# Patient Record
Sex: FEMALE | Race: WHITE | NOT HISPANIC OR LATINO | Employment: OTHER | URBAN - NONMETROPOLITAN AREA
[De-identification: names, ages, dates, MRNs, and addresses within clinical notes are randomized per-mention and may not be internally consistent; named-entity substitution may affect disease eponyms.]

---

## 2020-12-30 ENCOUNTER — OFFICE VISIT (OUTPATIENT)
Dept: OTOLARYNGOLOGY | Facility: OTHER | Age: 35
End: 2020-12-30
Attending: PHYSICIAN ASSISTANT
Payer: COMMERCIAL

## 2020-12-30 VITALS
HEART RATE: 84 BPM | TEMPERATURE: 97.1 F | OXYGEN SATURATION: 98 % | SYSTOLIC BLOOD PRESSURE: 124 MMHG | BODY MASS INDEX: 30.83 KG/M2 | WEIGHT: 174 LBS | DIASTOLIC BLOOD PRESSURE: 72 MMHG | HEIGHT: 63 IN

## 2020-12-30 DIAGNOSIS — R49.0 HOARSENESS: ICD-10-CM

## 2020-12-30 DIAGNOSIS — R22.1 NECK FULLNESS: ICD-10-CM

## 2020-12-30 DIAGNOSIS — J34.3 NASAL TURBINATE HYPERTROPHY: ICD-10-CM

## 2020-12-30 DIAGNOSIS — K21.9 LPRD (LARYNGOPHARYNGEAL REFLUX DISEASE): Primary | ICD-10-CM

## 2020-12-30 DIAGNOSIS — Z72.0 TOBACCO USE: ICD-10-CM

## 2020-12-30 PROCEDURE — 31575 DIAGNOSTIC LARYNGOSCOPY: CPT | Performed by: PHYSICIAN ASSISTANT

## 2020-12-30 PROCEDURE — 99203 OFFICE O/P NEW LOW 30 MIN: CPT | Mod: 25 | Performed by: PHYSICIAN ASSISTANT

## 2020-12-30 RX ORDER — NAPROXEN 500 MG/1
500 TABLET ORAL
COMMUNITY
End: 2020-12-30

## 2020-12-30 RX ORDER — OMEPRAZOLE 40 MG/1
40 CAPSULE, DELAYED RELEASE ORAL DAILY
Qty: 30 CAPSULE | Refills: 3 | Status: SHIPPED | OUTPATIENT
Start: 2020-12-30 | End: 2022-09-06

## 2020-12-30 RX ORDER — FLUTICASONE PROPIONATE 50 MCG
2 SPRAY, SUSPENSION (ML) NASAL DAILY
Qty: 16 G | Refills: 11 | Status: SHIPPED | OUTPATIENT
Start: 2020-12-30 | End: 2022-09-06

## 2020-12-30 RX ORDER — CLINDAMYCIN HCL 300 MG
CAPSULE ORAL
COMMUNITY
Start: 2020-01-07 | End: 2020-12-30

## 2020-12-30 RX ORDER — CLOTRIMAZOLE 1 %
CREAM (GRAM) TOPICAL 2 TIMES DAILY
COMMUNITY
End: 2021-02-02

## 2020-12-30 RX ORDER — MELATONIN 10 MG
20 CAPSULE ORAL
COMMUNITY
End: 2020-12-30

## 2020-12-30 RX ORDER — NITROFURANTOIN 25; 75 MG/1; MG/1
CAPSULE ORAL
COMMUNITY
Start: 2020-08-27 | End: 2020-12-30

## 2020-12-30 RX ORDER — PHENAZOPYRIDINE HYDROCHLORIDE 100 MG/1
TABLET, FILM COATED ORAL
COMMUNITY
Start: 2020-08-27 | End: 2020-12-30

## 2020-12-30 ASSESSMENT — PAIN SCALES - GENERAL: PAINLEVEL: NO PAIN (1)

## 2020-12-30 ASSESSMENT — MIFFLIN-ST. JEOR: SCORE: 1443.39

## 2020-12-30 NOTE — LETTER
2020         RE: Christina R Aaseng  [address]  [address]  [facility] [address]        Dear Colleague,    Thank you for referring your patient, Christina R Aaseng, to the [facility]. Please see a copy of my visit note below.    Otolaryngology Consultation    Patient: Christina R Aaseng  : 1985    Patient presents with:  Throat Problem: Pt has been referred by Dr. Brush for chronic laryngitis.      HPI:  Christina R Aaseng is a 35 year old female seen today for chronic laryngitis, tonsil concerns.   She has been c/o vocal hoarseness for the last several months. Describes episodes of laryngitis- normal voice then hoarseness or aphonia.   Typically she will try vocal rest which does improve her symptoms.  However, it seems to be occurring more frequently and has been ongoing for several months.   Further states she has developed episodic swelling along her neck, describes mass and then subsides.     She does use tobacco- 1/2 ppd for 20+ years.   She has been taking OTC remedies without relief.   She does not recall reflux symptoms.   She has increase in seasonal allergies. No prior MQT.   No worrisome sinus complaints.   +Post nasal drainage.   +Hx of neck swelling.     Patient denies sore throat or throat pain  Denies chronic otalgia.   Denies fevers, or weight loss. Night sweats.  +headaches Hx of TBI. No recent neuro for the last 5 years.   Denies dysphagia.   Denies globus sensation.   No cervical pain or tenderness.     Water- Limited  Caffeine- coffee- 2 cups/ koolaid/ teas  ETOH- None- 8 months sober.   Tobacco- Currently using, 1/2 PPD  Reflux- None.   Thyroid labs completed and normal.     Reports diarrhea has been ongoing for several weeks and has not been improving.   +Fatigue  Completed labs- thyroid, CBC, CMP.  No stool sample/ imaging completed.       No current outpatient medications on file.       Allergies: Patient has no allergy information on record.     No  "past medical history on file.    No past surgical history on file.    ENT family history reviewed    Social History     Tobacco Use     Smoking status: Not on file   Substance Use Topics     Alcohol use: Not on file     Drug use: Not on file       Review of Systems  ROS: 10 point ROS neg other than the symptoms noted above in the HPI     Physical Exam  /72 (Cuff Size: Adult Regular)   Pulse 84   Temp 97.1  F (36.2  C) (Tympanic)   Ht 1.6 m (5' 3\")   Wt 78.9 kg (174 lb)   SpO2 98%   BMI 30.82 kg/m    General - The patient is well nourished and well developed, and appears to have good nutritional status.  Alert and oriented to person and place, answers questions and cooperates with examination appropriately. Gait- walks with limp. Does have healed site of injury to left ankle/ lower leg.   Head and Face - Normocephalic and atraumatic, with no gross asymmetry noted.  The facial nerve is intact, with strong symmetric movements.  Voice and Breathing - The patient was breathing comfortably without the use of accessory muscles. There was no wheezing, stridor, or stertor.  The patients voice was clear and strong, and had appropriate pitch and quality. There is mild hoarseness, raspy at times.   Ears -The external auditory canals are patent, the tympanic membranes are intact without effusion, retraction or mass.  Bony landmarks are intact.  Eyes - Extraocular movements intact, and the pupils were reactive to light.  Sclera were not icteric or injected, conjunctiva were pink and moist.  Mouth - Examination of the oral cavity showed pink, healthy oral mucosa. No lesions or ulcerations noted.  The tongue was mobile and midline, and the dentition were in good condition.    Throat - The walls of the oropharynx were smooth, pink, moist, symmetric, and had no lesions or ulcerations.  The tonsillar pillars and soft palate were symmetric. Tonsils- noted erythema along tonsil tissue. Bilateral tonsils grade 3. The uvula " was midline on elevation.    Neck - Normal midline excursion of the laryngotracheal complex during swallowing.  Full range of motion on passive movement.  Palpation of the occipital, submental, submandibular, internal jugular chain, and supraclavicular nodes did not demonstrate any abnormal lymph nodes or masses.  Palpation of the thyroid was soft and smooth, with no nodules or goiter appreciated.  The trachea was mobile and midline. No palpable mass.   Nose - External contour is symmetric, no gross deflection or scars.  Nasal mucosa is pink and moist with no abnormal mucus.  +boggy enlarged turbinates.     Flexible Endoscopy -    Attempts at mirror laryngoscopy were not possible due to gag reflex.  Therefore I proceeded with a fiberoptic examination.  First I sprayed both sides of the nose with a mixture of lidocaine and neosynephrine.  I then passed the scope through the nasal cavity.    The nasal cavity was unremarkable.  The nasopharynx was mucosally covered and symmetric.  The Eustachian tube openings were unobstructed.  Going further down I had a clear view of the base of tongue, which had normal appearing lingual tonsillar tissue.  The base of tongue was free of lesions, and the vallecula was open.  The epiglottis was smooth and mucosally covered.  The supraglottic larynx was then clearly visualized.  The vocal cords moved smoothly and symmetrically, they were slightly edematous but pearly white and no lesions were seen.  I did note a significant amount of edema and redundant mucosa in the interarytenoid soft tissues and posterior surface of the larynx.  The pyriform sinuses were open, and the limited view of the postcricoid region did not show any erosive or mass lesions.          ASSESSMENT:      ICD-10-CM    1. LPRD (laryngopharyngeal reflux disease)  K21.9 omeprazole (PRILOSEC) 40 MG DR capsule     CT Soft Tissue Neck w Contrast   2. Hoarseness  R49.0 omeprazole (PRILOSEC) 40 MG DR capsule     CT Soft  Tissue Neck w Contrast   3. Neck fullness  R22.1 CT Soft Tissue Neck w Contrast   4. Nasal turbinate hypertrophy  J34.3 fluticasone (FLONASE) 50 MCG/ACT nasal spray   5. Tobacco use  Z72.0 CT Soft Tissue Neck w Contrast     Start Flonase 2 sprays to each nostril daily.     Start prilosec 40 mg daily.   Complete Neck CT.   Once scan completed, would refer for speech therapy/ voice therapy.   Follow LPR precautions. Increase water intake  Recheck in 4-5 weeks.     Quit tobacco.   Tobacco cessation was strongly encouraged.  The associated risk of head and neck cancer was discussed.  Every year of cessation offers health benefits. This was discussed with the patient today and they voiced understanding.    She is congratulated on her sobriety.      Adult lifestyle changes to prevent LPR  Reveiwed      Avoid eating and drinking within two to three hours prior to bedtime    Do not drink alcohol    Eat small meals and slowly    Limit problem foods:    o Caffeine  o Carbonated drinks  o Chocolate  o Peppermint  o Tomato  o Citrus fruits  o Fatty and fried foods      Lose weight    Quit smoking    Wear loose clothing          Yeni Leone PA-C  ENT  [facility] [place]  [phone]        Again, thank you for allowing me to participate in the care of your patient.        Sincerely,        Yeni Leone PA-C

## 2020-12-30 NOTE — NURSING NOTE
"Chief Complaint   Patient presents with     Throat Problem     Pt has been referred by Dr. Brush for chronic laryngitis.       Initial /72 (Cuff Size: Adult Regular)   Pulse 84   Temp 97.1  F (36.2  C) (Tympanic)   Ht 1.6 m (5' 3\")   Wt 78.9 kg (174 lb)   SpO2 98%   BMI 30.82 kg/m   Estimated body mass index is 30.82 kg/m  as calculated from the following:    Height as of this encounter: 1.6 m (5' 3\").    Weight as of this encounter: 78.9 kg (174 lb).  Medication Reconciliation: complete  Trupti Rosenberg LPN    "

## 2020-12-30 NOTE — PATIENT INSTRUCTIONS
Start Flonase 2 sprays to each nostril daily.   Nose overall looks boggy /swollen. Use spray to reduce swelling.     Start prilosec 40 mg daily.   Complete Neck CT.   Once scan completed, would refer for speech therapy/ voice therapy.   Follow LPR precautions. Increase water intake  Recheck in 4-5 weeks.   Quit tobacco    Thank you for allowing RICHARD Kessler and our ENT team to participate in your care.  If your medications are too expensive, please give the nurse a call.  We can possibly change this medication.  If you have a scheduling or an appointment question please contact our Health Unit Coordinator at their direct line [phone].   ALL nursing questions or concerns can be directed to your ENT nurse at: [phone]--Samia      Adult lifestyle changes to prevent LPR reviewed      Avoid eating and drinking within two to three hours prior to bedtime    Do not drink alcohol    Eat small meals and slowly    Limit problem foods:    o Caffeine  o Carbonated drinks  o Chocolate  o Peppermint  o Tomato  o Citrus fruits  o Fatty and fried foods      Lose weight    Quit smoking    Wear loose clothing

## 2020-12-30 NOTE — PROGRESS NOTES
Otolaryngology Consultation    Patient: Christina R Aaseng  : 1985    Patient presents with:  Throat Problem: Pt has been referred by Dr. Brush for chronic laryngitis.      HPI:  Christina R Aaseng is a 35 year old female seen today for chronic laryngitis, tonsil concerns.   She has been c/o vocal hoarseness for the last several months. Describes episodes of laryngitis- normal voice then hoarseness or aphonia.   Typically she will try vocal rest which does improve her symptoms.  However, it seems to be occurring more frequently and has been ongoing for several months.   Further states she has developed episodic swelling along her neck, describes mass and then subsides.     She does use tobacco- 1/2 ppd for 20+ years.   She has been taking OTC remedies without relief.   She does not recall reflux symptoms.   She has increase in seasonal allergies. No prior MQT.   No worrisome sinus complaints.   +Post nasal drainage.   +Hx of neck swelling.     Patient denies sore throat or throat pain  Denies chronic otalgia.   Denies fevers, or weight loss. Night sweats.  +headaches Hx of TBI. No recent neuro for the last 5 years.   Denies dysphagia.   Denies globus sensation.   No cervical pain or tenderness.     Water- Limited  Caffeine- coffee- 2 cups/ koolaid/ teas  ETOH- None- 8 months sober.   Tobacco- Currently using, 1/2 PPD  Reflux- None.   Thyroid labs completed and normal.     Reports diarrhea has been ongoing for several weeks and has not been improving.   +Fatigue  Completed labs- thyroid, CBC, CMP.  No stool sample/ imaging completed.       No current outpatient medications on file.       Allergies: Patient has no allergy information on record.     No past medical history on file.    No past surgical history on file.    ENT family history reviewed    Social History     Tobacco Use     Smoking status: Not on file   Substance Use Topics     Alcohol use: Not on file     Drug use: Not on file       Review of  "Systems  ROS: 10 point ROS neg other than the symptoms noted above in the HPI     Physical Exam  /72 (Cuff Size: Adult Regular)   Pulse 84   Temp 97.1  F (36.2  C) (Tympanic)   Ht 1.6 m (5' 3\")   Wt 78.9 kg (174 lb)   SpO2 98%   BMI 30.82 kg/m    General - The patient is well nourished and well developed, and appears to have good nutritional status.  Alert and oriented to person and place, answers questions and cooperates with examination appropriately. Gait- walks with limp. Does have healed site of injury to left ankle/ lower leg.   Head and Face - Normocephalic and atraumatic, with no gross asymmetry noted.  The facial nerve is intact, with strong symmetric movements.  Voice and Breathing - The patient was breathing comfortably without the use of accessory muscles. There was no wheezing, stridor, or stertor.  The patients voice was clear and strong, and had appropriate pitch and quality. There is mild hoarseness, raspy at times.   Ears -The external auditory canals are patent, the tympanic membranes are intact without effusion, retraction or mass.  Bony landmarks are intact.  Eyes - Extraocular movements intact, and the pupils were reactive to light.  Sclera were not icteric or injected, conjunctiva were pink and moist.  Mouth - Examination of the oral cavity showed pink, healthy oral mucosa. No lesions or ulcerations noted.  The tongue was mobile and midline, and the dentition were in good condition.    Throat - The walls of the oropharynx were smooth, pink, moist, symmetric, and had no lesions or ulcerations.  The tonsillar pillars and soft palate were symmetric. Tonsils- noted erythema along tonsil tissue. Bilateral tonsils grade 3. The uvula was midline on elevation.    Neck - Normal midline excursion of the laryngotracheal complex during swallowing.  Full range of motion on passive movement.  Palpation of the occipital, submental, submandibular, internal jugular chain, and supraclavicular nodes " did not demonstrate any abnormal lymph nodes or masses.  Palpation of the thyroid was soft and smooth, with no nodules or goiter appreciated.  The trachea was mobile and midline. No palpable mass.   Nose - External contour is symmetric, no gross deflection or scars.  Nasal mucosa is pink and moist with no abnormal mucus.  +boggy enlarged turbinates.     Flexible Endoscopy -    Attempts at mirror laryngoscopy were not possible due to gag reflex.  Therefore I proceeded with a fiberoptic examination.  First I sprayed both sides of the nose with a mixture of lidocaine and neosynephrine.  I then passed the scope through the nasal cavity.    The nasal cavity was unremarkable.  The nasopharynx was mucosally covered and symmetric.  The Eustachian tube openings were unobstructed.  Going further down I had a clear view of the base of tongue, which had normal appearing lingual tonsillar tissue.  The base of tongue was free of lesions, and the vallecula was open.  The epiglottis was smooth and mucosally covered.  The supraglottic larynx was then clearly visualized.  The vocal cords moved smoothly and symmetrically, they were slightly edematous but pearly white and no lesions were seen.  I did note a significant amount of edema and redundant mucosa in the interarytenoid soft tissues and posterior surface of the larynx.  The pyriform sinuses were open, and the limited view of the postcricoid region did not show any erosive or mass lesions.          ASSESSMENT:      ICD-10-CM    1. LPRD (laryngopharyngeal reflux disease)  K21.9 omeprazole (PRILOSEC) 40 MG DR capsule     CT Soft Tissue Neck w Contrast   2. Hoarseness  R49.0 omeprazole (PRILOSEC) 40 MG DR capsule     CT Soft Tissue Neck w Contrast   3. Neck fullness  R22.1 CT Soft Tissue Neck w Contrast   4. Nasal turbinate hypertrophy  J34.3 fluticasone (FLONASE) 50 MCG/ACT nasal spray   5. Tobacco use  Z72.0 CT Soft Tissue Neck w Contrast     Start Flonase 2 sprays to each nostril  daily.     Start prilosec 40 mg daily.   Complete Neck CT.   Once scan completed, would refer for speech therapy/ voice therapy.   Follow LPR precautions. Increase water intake  Recheck in 4-5 weeks.     Quit tobacco.   Tobacco cessation was strongly encouraged.  The associated risk of head and neck cancer was discussed.  Every year of cessation offers health benefits. This was discussed with the patient today and they voiced understanding.    She is congratulated on her sobriety.      Adult lifestyle changes to prevent LPR  Reveiwed      Avoid eating and drinking within two to three hours prior to bedtime    Do not drink alcohol    Eat small meals and slowly    Limit problem foods:    o Caffeine  o Carbonated drinks  o Chocolate  o Peppermint  o Tomato  o Citrus fruits  o Fatty and fried foods      Lose weight    Quit smoking    Wear loose clothing          Yeni Leone PA-C  [facility], [place]  [phone]

## 2021-01-08 ENCOUNTER — HOSPITAL ENCOUNTER (OUTPATIENT)
Dept: CT IMAGING | Facility: HOSPITAL | Age: 36
Discharge: HOME | End: 2021-01-08
Attending: PHYSICIAN ASSISTANT | Admitting: PHYSICIAN ASSISTANT
Payer: COMMERCIAL

## 2021-01-08 DIAGNOSIS — R22.1 NECK FULLNESS: ICD-10-CM

## 2021-01-08 DIAGNOSIS — K21.9 LPRD (LARYNGOPHARYNGEAL REFLUX DISEASE): ICD-10-CM

## 2021-01-08 DIAGNOSIS — R49.0 HOARSENESS: ICD-10-CM

## 2021-01-08 DIAGNOSIS — Z72.0 TOBACCO USE: ICD-10-CM

## 2021-01-08 PROCEDURE — 70491 CT SOFT TISSUE NECK W/DYE: CPT

## 2021-01-08 PROCEDURE — 255N000002 HC RX 255 OP 636: Performed by: RADIOLOGY

## 2021-01-08 RX ORDER — IOPAMIDOL 612 MG/ML
100 INJECTION, SOLUTION INTRAVASCULAR ONCE
Status: COMPLETED | OUTPATIENT
Start: 2021-01-08 | End: 2021-01-08

## 2021-01-08 RX ADMIN — IOPAMIDOL 100 ML: 612 INJECTION, SOLUTION INTRAVENOUS at 08:55

## 2021-02-02 ENCOUNTER — OFFICE VISIT (OUTPATIENT)
Dept: OTOLARYNGOLOGY | Facility: OTHER | Age: 36
End: 2021-02-02
Attending: PHYSICIAN ASSISTANT
Payer: COMMERCIAL

## 2021-02-02 VITALS
WEIGHT: 175 LBS | BODY MASS INDEX: 31 KG/M2 | HEART RATE: 70 BPM | SYSTOLIC BLOOD PRESSURE: 100 MMHG | OXYGEN SATURATION: 97 % | DIASTOLIC BLOOD PRESSURE: 60 MMHG | TEMPERATURE: 97.3 F

## 2021-02-02 DIAGNOSIS — R09.82 POST-NASAL DRAINAGE: ICD-10-CM

## 2021-02-02 DIAGNOSIS — J34.3 NASAL TURBINATE HYPERTROPHY: ICD-10-CM

## 2021-02-02 DIAGNOSIS — K21.9 LPRD (LARYNGOPHARYNGEAL REFLUX DISEASE): Primary | ICD-10-CM

## 2021-02-02 DIAGNOSIS — R49.0 HOARSENESS: ICD-10-CM

## 2021-02-02 PROCEDURE — 31575 DIAGNOSTIC LARYNGOSCOPY: CPT | Performed by: PHYSICIAN ASSISTANT

## 2021-02-02 PROCEDURE — 99213 OFFICE O/P EST LOW 20 MIN: CPT | Mod: 25 | Performed by: PHYSICIAN ASSISTANT

## 2021-02-02 RX ORDER — CETIRIZINE HYDROCHLORIDE 10 MG/1
10 TABLET ORAL DAILY
Qty: 90 TABLET | Refills: 3 | Status: SHIPPED | OUTPATIENT
Start: 2021-02-02 | End: 2022-09-06

## 2021-02-02 RX ORDER — ESCITALOPRAM OXALATE 20 MG/1
20 TABLET ORAL DAILY
COMMUNITY
End: 2022-09-06

## 2021-02-02 ASSESSMENT — PAIN SCALES - GENERAL: PAINLEVEL: NO PAIN (0)

## 2021-02-02 NOTE — PATIENT INSTRUCTIONS
Continue with Prilosec daily.   Maintain good water intake.   Continue with Flonase.   Start Zyrtec one tablet daily.   Quit tobacco  Speech consult  Neck CT- normal.     Follow up in 2 months.     Thank you for allowing Yeni Leone PA-C and our ENT team to participate in your care.  If your medications are too expensive, please give the nurse a call.  We can possibly change this medication.  If you have a scheduling or an appointment question please contact our Health Unit Coordinator at their direct line [phone].   ALL nursing questions or concerns can be directed to your ENT nurse at: [phone] Samia

## 2021-02-02 NOTE — LETTER
2/2/2021         RE: Christina R Aaseng  [address]  [address]  [facility] [address]        Dear Colleague,    Thank you for referring your patient, Christina R Aaseng, to the [facility]. Please see a copy of my visit note below.    Chief Complaint   Patient presents with     Throat Problem     Pt is here for a f/u LPRD, hoarseness, and NTH.         Eli presents to ENT for follow up of LPR, hoarseness.   She was last seen on 12/30/20 and was noted to have LPR changes on examination. Complete Neck CT due to intermittent neck fullness which was negative.   She was started on prilosec daily, Flonase for IT hypertrophy and recommended to quit tobacco.     Today, she has been doing well since her last visit.   She has felt less episodes of laryngitis since starting the Prilosec. She has no brian swelling episodes at this time. Her voice remains hoarse at this time.   She has not been using Flonase daily. Started humidifiers at this time.       PROCEDURE: CT SOFT TISSUE NECK W CONTRAST 1/8/2021 9:06 AM     HISTORY: LPRD (laryngopharyngeal reflux disease); Hoarseness; Neck  fullness; Tobacco use     COMPARISONS: None.     Meds/Dose Given: Isovue 300, 100ml     TECHNIQUE: CT scan of the neck with IV contrast sagittal coronal  reconstructions were obtained.     FINDINGS: The muscles of mastication and the parapharyngeal spaces  appear normal. The tonsils appear enlarged bilaterally. The larynx and  upper trachea is normal. The thyroid gland is normal. The salivary  glands are normal. The cervical supraclavicular and upper mediastinal  lymph nodes appear normal the lung apices are clear                                                                        IMPRESSION: Enlarged tonsils. No neck masses are seen.     BRIAN PEDRO MD  No past medical history on file.     Allergies   Allergen Reactions     Morphine      Became confused and non responsive     Current Outpatient Medications    Medication     cholecalciferol 25 MCG (1000 UT) TABS     clotrimazole (LOTRIMIN) 1 % external cream     fluticasone (FLONASE) 50 MCG/ACT nasal spray     omeprazole (PRILOSEC) 40 MG DR capsule     Sertraline HCl (ZOLOFT PO)     No current facility-administered medications for this visit.       ROS: 10 point ROS neg other than the symptoms noted above in the HPI.  /60   Pulse 70   Temp 97.3  F (36.3  C) (Tympanic)   Wt 79.4 kg (175 lb)   SpO2 97%   BMI 31.00 kg/m      General - The patient is well nourished and well developed, and appears to have good nutritional status.  Alert and oriented to person and place, answers questions and cooperates with examination appropriately. Gait- walks with limp. Does have healed site of injury to left ankle/ lower leg.   Head and Face - Normocephalic and atraumatic, with no gross asymmetry noted.  The facial nerve is intact, with strong symmetric movements.  Voice and Breathing - The patient was breathing comfortably without the use of accessory muscles. There was no wheezing, stridor, or stertor.  The patients voice was clear and strong, and had appropriate pitch and quality. There is mild hoarseness, raspy at times.   Ears -The external auditory canals are patent, the tympanic membranes are intact without effusion, retraction or mass.  Bony landmarks are intact.  Eyes - Extraocular movements intact, and the pupils were reactive to light.  Sclera were not icteric or injected, conjunctiva were pink and moist.  Mouth - Examination of the oral cavity showed pink, healthy oral mucosa. No lesions or ulcerations noted.  The tongue was mobile and midline, and the dentition were in good condition.    Throat - The walls of the oropharynx were smooth, pink, moist, symmetric, and had no lesions or ulcerations.  The tonsillar pillars and soft palate were symmetric. Tonsils- noted erythema along tonsil tissue. Bilateral tonsils grade 3. The uvula was midline on elevation.    Neck -  Normal midline excursion of the laryngotracheal complex during swallowing.  Full range of motion on passive movement.  Palpation of the occipital, submental, submandibular, internal jugular chain, and supraclavicular nodes did not demonstrate any abnormal lymph nodes or masses.  Palpation of the thyroid was soft and smooth, with no nodules or goiter appreciated.  The trachea was mobile and midline. No palpable mass.   Nose - External contour is symmetric, no gross deflection or scars.  Nasal mucosa is pink and moist with no abnormal mucus.  +boggy enlarged turbinates.      Flexible Endoscopy -     Attempts at mirror laryngoscopy were not possible due to gag reflex.  Therefore I proceeded with a fiberoptic examination.  First I sprayed both sides of the nose with a mixture of lidocaine and neosynephrine.  I then passed the scope through the nasal cavity. She did have secretions in NP and along posterior oropharynx.    The nasal cavity was unremarkable.  The nasopharynx was mucosally covered and symmetric.  The Eustachian tube openings were unobstructed.  Going further down I had a clear view of the base of tongue, which had normal appearing lingual tonsillar tissue.  The base of tongue was free of lesions, and the vallecula was open.  The epiglottis was smooth and mucosally covered.  The supraglottic larynx was then clearly visualized.  The vocal cords moved smoothly and symmetrically, they were slightly edematous but pearly white and no lesions were seen.  I did note a significant amount of edema and redundant mucosa in the interarytenoid soft tissues and posterior surface of the larynx.  The pyriform sinuses were open, and the limited view of the postcricoid region did not show any erosive or mass lesions.           ASSESSMENT:    ICD-10-CM    1. LPRD (laryngopharyngeal reflux disease)  K21.9    2. Hoarseness  R49.0 SPEECH THERAPY REFERRAL   3. Nasal turbinate hypertrophy  J34.3    4. Post-nasal drainage  R09.82  cetirizine (ZYRTEC) 10 MG tablet     She is doing well at this time and has noted improvement.   She has felt less laryngitis concerns and has been responding well to Prilosec.   Continue with Prilosec and Follow LPR precautions.   Return to ENT in 2 months  She does wish to attend speech therapy for recheck as she had several bouts of laryngitis/ muscle weakness.   Referral to Speech therapy.   Vocal hygiene.    CT of Neck was negative.     Quit tobacco.   Tobacco cessation was strongly encouraged.  The associated risk of head and neck cancer was discussed.  Every year of cessation offers health benefits. This was discussed with the patient today and they voiced understanding.        Yeni Leone PA-C  ENT  [facility]  [phone]        Again, thank you for allowing me to participate in the care of your patient.        Sincerely,        Yeni Leone PA-C

## 2021-02-02 NOTE — NURSING NOTE
"Chief Complaint   Patient presents with     Throat Problem     Pt is here for a f/u LPRD, hoarseness, and NTH.       Initial /60   Pulse 70   Temp 97.3  F (36.3  C) (Tympanic)   Wt 79.4 kg (175 lb)   SpO2 97%   BMI 31.00 kg/m   Estimated body mass index is 31 kg/m  as calculated from the following:    Height as of 12/30/20: 1.6 m (5' 3\").    Weight as of this encounter: 79.4 kg (175 lb).  Medication Reconciliation: complete  Ese Rowley LPN  "

## 2021-02-02 NOTE — PROGRESS NOTES
Chief Complaint   Patient presents with     Throat Problem     Pt is here for a f/u LPRD, hoarseness, and NTH.         Eli presents to ENT for follow up of LPR, hoarseness.   She was last seen on 12/30/20 and was noted to have LPR changes on examination. Complete Neck CT due to intermittent neck fullness which was negative.   She was started on prilosec daily, Flonase for IT hypertrophy and recommended to quit tobacco.     Today, she has been doing well since her last visit.   She has felt less episodes of laryngitis since starting the Prilosec. She has no brian swelling episodes at this time. Her voice remains hoarse at this time.   She has not been using Flonase daily. Started humidifiers at this time.       PROCEDURE: CT SOFT TISSUE NECK W CONTRAST 1/8/2021 9:06 AM     HISTORY: LPRD (laryngopharyngeal reflux disease); Hoarseness; Neck  fullness; Tobacco use     COMPARISONS: None.     Meds/Dose Given: Isovue 300, 100ml     TECHNIQUE: CT scan of the neck with IV contrast sagittal coronal  reconstructions were obtained.     FINDINGS: The muscles of mastication and the parapharyngeal spaces  appear normal. The tonsils appear enlarged bilaterally. The larynx and  upper trachea is normal. The thyroid gland is normal. The salivary  glands are normal. The cervical supraclavicular and upper mediastinal  lymph nodes appear normal the lung apices are clear                                                                        IMPRESSION: Enlarged tonsils. No neck masses are seen.     BRIAN PEDRO MD  No past medical history on file.     Allergies   Allergen Reactions     Morphine      Became confused and non responsive     Current Outpatient Medications   Medication     cholecalciferol 25 MCG (1000 UT) TABS     clotrimazole (LOTRIMIN) 1 % external cream     fluticasone (FLONASE) 50 MCG/ACT nasal spray     omeprazole (PRILOSEC) 40 MG DR capsule     Sertraline HCl (ZOLOFT PO)     No current facility-administered  medications for this visit.       ROS: 10 point ROS neg other than the symptoms noted above in the HPI.  /60   Pulse 70   Temp 97.3  F (36.3  C) (Tympanic)   Wt 79.4 kg (175 lb)   SpO2 97%   BMI 31.00 kg/m      General - The patient is well nourished and well developed, and appears to have good nutritional status.  Alert and oriented to person and place, answers questions and cooperates with examination appropriately. Gait- walks with limp. Does have healed site of injury to left ankle/ lower leg.   Head and Face - Normocephalic and atraumatic, with no gross asymmetry noted.  The facial nerve is intact, with strong symmetric movements.  Voice and Breathing - The patient was breathing comfortably without the use of accessory muscles. There was no wheezing, stridor, or stertor.  The patients voice was clear and strong, and had appropriate pitch and quality. There is mild hoarseness, raspy at times.   Ears -The external auditory canals are patent, the tympanic membranes are intact without effusion, retraction or mass.  Bony landmarks are intact.  Eyes - Extraocular movements intact, and the pupils were reactive to light.  Sclera were not icteric or injected, conjunctiva were pink and moist.  Mouth - Examination of the oral cavity showed pink, healthy oral mucosa. No lesions or ulcerations noted.  The tongue was mobile and midline, and the dentition were in good condition.    Throat - The walls of the oropharynx were smooth, pink, moist, symmetric, and had no lesions or ulcerations.  The tonsillar pillars and soft palate were symmetric. Tonsils- noted erythema along tonsil tissue. Bilateral tonsils grade 3. The uvula was midline on elevation.    Neck - Normal midline excursion of the laryngotracheal complex during swallowing.  Full range of motion on passive movement.  Palpation of the occipital, submental, submandibular, internal jugular chain, and supraclavicular nodes did not demonstrate any abnormal lymph  nodes or masses.  Palpation of the thyroid was soft and smooth, with no nodules or goiter appreciated.  The trachea was mobile and midline. No palpable mass.   Nose - External contour is symmetric, no gross deflection or scars.  Nasal mucosa is pink and moist with no abnormal mucus.  +boggy enlarged turbinates.      Flexible Endoscopy -     Attempts at mirror laryngoscopy were not possible due to gag reflex.  Therefore I proceeded with a fiberoptic examination.  First I sprayed both sides of the nose with a mixture of lidocaine and neosynephrine.  I then passed the scope through the nasal cavity. She did have secretions in NP and along posterior oropharynx.    The nasal cavity was unremarkable.  The nasopharynx was mucosally covered and symmetric.  The Eustachian tube openings were unobstructed.  Going further down I had a clear view of the base of tongue, which had normal appearing lingual tonsillar tissue.  The base of tongue was free of lesions, and the vallecula was open.  The epiglottis was smooth and mucosally covered.  The supraglottic larynx was then clearly visualized.  The vocal cords moved smoothly and symmetrically, they were slightly edematous but pearly white and no lesions were seen.  I did note a significant amount of edema and redundant mucosa in the interarytenoid soft tissues and posterior surface of the larynx.  The pyriform sinuses were open, and the limited view of the postcricoid region did not show any erosive or mass lesions.           ASSESSMENT:    ICD-10-CM    1. LPRD (laryngopharyngeal reflux disease)  K21.9    2. Hoarseness  R49.0 SPEECH THERAPY REFERRAL   3. Nasal turbinate hypertrophy  J34.3    4. Post-nasal drainage  R09.82 cetirizine (ZYRTEC) 10 MG tablet     She is doing well at this time and has noted improvement.   She has felt less laryngitis concerns and has been responding well to Prilosec.   Continue with Prilosec and Follow LPR precautions.   Return to ENT in 2 months  She  does wish to attend speech therapy for recheck as she had several bouts of laryngitis/ muscle weakness.   Referral to Speech therapy.   Vocal hygiene.    CT of Neck was negative.     Quit tobacco.   Tobacco cessation was strongly encouraged.  The associated risk of head and neck cancer was discussed.  Every year of cessation offers health benefits. This was discussed with the patient today and they voiced understanding.        Yeni Leone PA-C  ENT  [facility]  [phone]

## 2021-02-17 ENCOUNTER — HOSPITAL ENCOUNTER (OUTPATIENT)
Dept: SPEECH THERAPY | Facility: HOSPITAL | Age: 36
Setting detail: THERAPIES SERIES
End: 2021-02-17
Attending: PHYSICIAN ASSISTANT
Payer: COMMERCIAL

## 2021-02-17 DIAGNOSIS — R49.0 HOARSENESS: ICD-10-CM

## 2021-02-17 PROCEDURE — 92524 BEHAVRAL QUALIT ANALYS VOICE: CPT | Mod: GN

## 2021-02-17 NOTE — PROGRESS NOTES
02/17/21 0800      Comments Referral Number: [id number]   General Information   Type of Evaluation Voice   Type Of Visit Initial   Start Of Care Date 02/17/21   Referring Physician Yeni Leone PA-C   Orders Evaluate And Treat   Orders Comment Hoarseness   Precautions/Limitations  no known precautions/limitations   Hearing WNL   Pertinent History Of Current Problem Patient is a 35 year old female who presents for a voice evaluation due to ongoing hoarseness and laryngitis. Patient reports that she has been smoking for 20+ years and is currently trying to quit; currently smoking 5-7 cigarettes a day. States that she drinks limited water throughout the day. Drinks one to two cups of coffee a day. 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 Patient has a history of reflux and post nasal drink; is currently taking Prilosec and Flonase with some relief. States that she is not doing any diet modifications for her reflux and notes frequent throat clearing and coughing. Patient reports that she recently started a humidifier in her house and bedroom. States that her voice is typically worse in the morning or the weeks that she has her fiancé's children at the house.    Patient Role/employment History Student;Other/comments  (will be returning to school next semester)   Living environment [place]   General Observations Patient has a service dog with her. 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  The base of tongue was free of lesions, and the vallecula was open.  The epiglottis was smooth and mucosally covered.  The supraglottic larynx was then clearly visualized.  The vocal cords moved smoothly and  symmetrically, they were slightly edematous but pearly white and no lesions were seen.  I did note a significant amount of edema and redundant mucosa in the interarytenoid soft tissues and posterior surface of the larynx.  The pyriform sinuses were open, and the limited view of the postcricoid region did not show any erosive or mass lesions.   Speech   Deficits in Speech Respiration None   Deficits in Phonation Hoarse quality;Harsh quality;Low pitch;Loudness (soft);Other (Comment);Strained-strangled quality  (periods of vocal arrest)   Sustained vowel production 9.8   S/Z Ratio 0.92   Deficits in Articulation None   Deficits in Resonance None   AIDS-sentences, % intelligible 100   Speech Comments Patient's maximum phonation time was 9.80 seconds. Average maximum phonation duration for a woman the patient's age is 21.34 seconds. Patient's phonation time for /s/ was 12.52 seconds and 13.55 seconds for /z/ which equates to a 0.92 s/z ratio. A quotient greater than 1.4 in adults suggests glottal valving inefficiency. Patient did demonstrate periods of vocal arrests during conversation and reading   Cognitive Status Examination   Cognitive Status Exam Comments Patient reports that she had a mild TBI in 2005 and completed therapy. She reports continued difficulty with memory and compensates by taking notes and writing important information down. Patient was interactive throughout the evaluation and highlighted/stared important information that we discussed that were in the handouts provided.    Education Assessment   Barriers to Learning No barriers   Preferred Learning Style Demonstration;Pictures/video;Listening   General Therapy Interventions   Planned Therapy Interventions Voice   Voice Throat clearing elimination plan;Voice quality/pitch or volume tasks   Intervention Comments Increase water intake, quit smoking, eliminate throat clearing, vocal exercises   Clinical Impression, SLP Eval   Criteria for Skilled  Therapeutic Interventions Met (SLP Eval) yes;treatment indicated   SLP Diagnosis vocal hoarseness   Functional limitations due to impairments Patient is at a safety risk due to an inability to compete with environmental background noise due to vocal degrade   Therapy Frequency 1 time;per week   Predicted Duration of Therapy Intervention (days/wks) 3 months   Risks and Benefits of Treatment have been explained. Yes   Patient, Family & other staff in agreement with plan of care Yes   Clinical Impression Comments Patient is demonstrating vocal degrade due to significant edema near the vocal folds and  inappropriate voicing with following characteristics: hoarse, harsh, low pitch, and neck strain during voicing.  Patient's ability to voice appears to fatigue patient and she demonstrates brief periods of vocal arrests. Patient was provided skilled education and handouts regarding how the voice works, vocal hygiene tips, throat clearing/coughing elimination strategies, and a handout regarding reflux and how to self-manage. Patient took notes during the discussion and was actively involved. Encourage patient to follow vocal hygiene tips, specifically 1. Increase water intake, 2. Quit smoking, and 3. Eliminate throat clearing and coughing. Patient verbalized understanding of recommendations. Will address vocal exercises to increase vocal quality. SLP services are recommended to address vocal quality and endurance to allow patient to communicate for a variety of purposes and communicative contexts.     Cognitive/Communication Goals   Cognitive/Communication Goals 1;2;3   Cognitive/Communication Goal 1   Goal Identifier LTG   Goal Description Patient will demonstrate improved vocal quality for sustained speech at the conversational level to communicate basic medical and social needs in functional living environment   Target Date 05/18/21   Cognitive/Communication Goal 2   Goal Identifier STG 1   Goal Description Patient will  utilize techniques to improve vocal hegiene and eliminate abusive vocal behavior with 90% accuracy with moderate cues   Target Date 05/18/21   Cognitive/Communication Goal 3   Goal Identifier STG 2   Goal Description Patient will complete vocal exercises to increase vocal quality with 90% accuracy with moderate cues at the sentence level   Target Date 05/18/21   Total Session Time   Voice Minutes (17082) 55   Total Evaluation Time 55   Therapy Certification   Certification date from 02/17/21   Certification date to 05/18/21   Medical Diagnosis Hoarseness   Certification I certify the need for these services furnished under this plan of treatment and while under my care.  (Physician co-signature of this document indicates review and certification of the therapy plan).   Retired SLP G-Codes   G-code Voice   Voice Current Status () CK   Voice Current Status Modifier Rationale Patient is presenting at evaluation today with moderate hoareness with noted vocal arrests   Voice Goal Status () CI       I certify the need for these services furnished under this plan of treatment and while under my care. (Physician co-signature of this document indicates review and certification of the therapy plan).      _____________________________     __________________________    ____________  Physician's Signature                 Date               Time

## 2021-02-17 NOTE — PROGRESS NOTES
Voice Handicap Index     The Voice Handicap Index (VHI) was administered to assess the patient s perception of their vocal quality and impact of their voice quality on their daily life.  The VHI is divided into three parts: Functional, Physical and Emotional.  The patient uses the following rating to answer questions: 0-never; 1-almost never; 2-sometimes; 3-almost always; 4-always. A total score is then calculated in order to provide a severity description.     Patient s self-report on Part 1: Functional revealed severe severity (23/40).  Patient indicated that: people have difficulty understanding me in a noisy room, my family has difficulty hearing me when I call them throughout the house, I tend to avoid groups of people because of my voice, and I speak with friends, neighbors, or relatives less often because of my voice.     Patient s self-report on Part 2: Physical revealed severe severity (27/44).  Patient indicated that: the sound of my voice varies throughout the day, people ask  what s wrong with your voice? , my voice sounds creaky and dry, I feel as though I have to strain to produce a voice, the clarity of my voice is unpredictable, I try to change my voice to sound different, and I use a great deal of effort to speak.    With regard to Part 3: Emotionally, patient s self-report revealed a moderate impact (19/36).  Patient indicated that: I m tense when talking with others because of my voice, people seem irritated with my voice, and I find other people don t understand my voice problem.    Overall, the patient s self-report on the VHI indicates a severe severity rating for severe voice quality (69/120). The patient s voice disorder is negatively impacting her everyday life and she would benefit from speech-language therapy services targeting voice.      Referral Number: [id number]      I certify the need for these services furnished under this plan of treatment and while under my care. (Physician  co-signature of this document indicates review and certification of the therapy plan).      _____________________________     __________________________    ____________  Physician's Signature                 Date               Time

## 2021-02-25 ENCOUNTER — HOSPITAL ENCOUNTER (OUTPATIENT)
Dept: SPEECH THERAPY | Facility: HOSPITAL | Age: 36
Setting detail: THERAPIES SERIES
End: 2021-02-25
Attending: PHYSICIAN ASSISTANT
Payer: COMMERCIAL

## 2021-02-25 PROCEDURE — 92507 TX SP LANG VOICE COMM INDIV: CPT | Mod: GN

## 2021-02-25 NOTE — PROGRESS NOTES
Christina Aaseng is a 35 year old female who is being seen via a billable video visit.       Patient has given verbal consent for Video visit? Yes     Video Start Time: 2:30     Telehealth Visit Details     Type of Service:  Telehealth     Video End Time (time video stopped): 2:55    Originating Location (pt. location): Home     Additional Participants in Telehealth Visit: none     Distant Location (provider location):  SPEECH THERAPY      Mode of Communication (Audio Visual or Audio Only):  Visual and audio     Rosalba Strange, SLP  February 25, 2021

## 2021-03-02 ENCOUNTER — HOSPITAL ENCOUNTER (OUTPATIENT)
Dept: SPEECH THERAPY | Facility: HOSPITAL | Age: 36
Setting detail: THERAPIES SERIES
End: 2021-03-02
Attending: PHYSICIAN ASSISTANT
Payer: COMMERCIAL

## 2021-03-02 PROCEDURE — 92507 TX SP LANG VOICE COMM INDIV: CPT | Mod: GN,GT

## 2021-03-02 NOTE — PROGRESS NOTES
Christina Aaseng is a 35 year old female who is being seen via a billable video visit.       Patient has given verbal consent for Video visit? Yes     Video Start Time: 2:30     Telehealth Visit Details     Type of Service:  Telehealth     Video End Time (time video stopped): 2:55    Originating Location (pt. location): patient's home     Additional Participants in Telehealth: none known     Distant Location (provider location):  SPEECH THERAPY      Mode of Communication (Audio Visual or Audio Only):  audio and visual     Rosalba Strange, SLP  March 2, 2021

## 2021-03-24 NOTE — PROGRESS NOTES
"Outpatient Speech Language Pathology Discharge Note     Patient: Christina R Aaseng  : 1985    Beginning/End Dates of Reporting Period:  2021 to 3/2/2021    Referring Provider: Yeni Leone PA    Therapy Diagnosis: Voice Disorder    Client Self Report:   Patient seen for skilled speech-language therapy via teletherapy today.    Objective Measurements:   Objective Measures: Objective Measure 1, Objective Measure 2, Objective Measure 3, Objective Measure 4, Objective Measure 5  Objective Measure: Throat clearing  Details: 0x during session; reports that she has created a significant amount of awareness with throat clearing and that the hard swallow really helps  Objective Measure: Smoking  Details: Has increase smoking now to roughly 10 cigarettes a day  Objective Measure: Water intake  Details: Increasing water intake but still not at desired amount. Discussed starting a log to keep track  Objective Measure: Vocal fold adduction exercises  Details: 5x \"ahh\" with hands pushing together to increase glottic closure. Encouraged to complete two more this afternoon.  Objective Measure: Voice rating  Details: rated 7 out of 10 today (10 being back to normal)      Goals:  Goal Identifier LTG   Goal Description Patient will demonstrate improved vocal quality for sustained speech at the conversational level to communicate basic medical and social needs in functional living environment   Target Date 21   Date Met      Progress:     Goal Identifier STG 1   Goal Description Patient will utilize techniques to improve vocal hegiene and eliminate abusive vocal behavior with 90% accuracy with moderate cues   Target Date 21   Date Met      Progress: GOAL NOT MET     Goal Identifier STG 2   Goal Description Patient will complete vocal exercises to increase vocal quality with 90% accuracy with moderate cues at the sentence level   Target Date 21   Date Met      Progress: GOAL NOT MET       Progress Toward " Goals:    Progress limited due to attendance. Patient only seen for two follow-up treatment sessions then canceled remaining appointments. Patient reports that she has all of the information she needs. Patient will be discharged from speech therapy. She is welcome to re-initiate speech therapy if she would like but would require new orders from MD.    Plan:  Discharge from therapy.    Discharge:    Reason for Discharge: Patient chooses to discontinue therapy.    Equipment Issued: none    Discharge Plan: Patient to continue home program.

## 2022-06-01 LAB
ALT SERPL-CCNC: 16 U/L
AST SERPL-CCNC: 18 U/L
CHOLESTEROL (EXTERNAL): 236 MG/DL
CREATININE (EXTERNAL): 0.8 MG/DL (ref 0.5–1)
GLUCOSE (EXTERNAL): 88 MG/DL (ref 74–100)
HBA1C MFR BLD: 5.4 % (ref 4–6)
HDLC SERPL-MCNC: 40 MG/DL
HPV ABSTRACT: NORMAL
LDL CHOLESTEROL CALCULATED (EXTERNAL): 143 MG/DL
NON HDL CHOLESTEROL (EXTERNAL): 196 MG/DL
POTASSIUM (EXTERNAL): 4 MMOL/L (ref 3.5–5.1)
TRIGLYCERIDES (EXTERNAL): 267 MG/DL

## 2022-06-24 LAB — TSH SERPL-ACNC: 3.09 UIU/ML (ref 0.35–4.94)

## 2022-09-05 LAB
ABO/RH(D): NORMAL
ANTIBODY SCREEN: NEGATIVE
SPECIMEN EXPIRATION DATE: NORMAL

## 2022-09-06 ENCOUNTER — PRENATAL OFFICE VISIT (OUTPATIENT)
Dept: OBGYN | Facility: OTHER | Age: 37
End: 2022-09-06
Attending: OBSTETRICS & GYNECOLOGY
Payer: COMMERCIAL

## 2022-09-06 VITALS
SYSTOLIC BLOOD PRESSURE: 120 MMHG | BODY MASS INDEX: 31.36 KG/M2 | OXYGEN SATURATION: 96 % | WEIGHT: 177 LBS | DIASTOLIC BLOOD PRESSURE: 60 MMHG | HEIGHT: 63 IN | HEART RATE: 86 BPM | RESPIRATION RATE: 18 BRPM

## 2022-09-06 DIAGNOSIS — O09.511 PRIMIGRAVIDA OF ADVANCED MATERNAL AGE IN FIRST TRIMESTER: ICD-10-CM

## 2022-09-06 DIAGNOSIS — Z34.91 PREGNANCY WITH UNCERTAIN DATES IN FIRST TRIMESTER: ICD-10-CM

## 2022-09-06 DIAGNOSIS — Z34.01 ENCOUNTER FOR SUPERVISION OF NORMAL FIRST PREGNANCY IN FIRST TRIMESTER: Primary | ICD-10-CM

## 2022-09-06 LAB
ALBUMIN UR-MCNC: 10 MG/DL
AMPHETAMINES UR QL: NOT DETECTED
APPEARANCE UR: CLEAR
BACTERIA #/AREA URNS HPF: ABNORMAL /HPF
BARBITURATES UR QL SCN: NOT DETECTED
BENZODIAZ UR QL SCN: NOT DETECTED
BILIRUB UR QL STRIP: NEGATIVE
BUPRENORPHINE UR QL: NOT DETECTED
CANNABINOIDS UR QL: NOT DETECTED
COCAINE UR QL SCN: NOT DETECTED
COLOR UR AUTO: YELLOW
D-METHAMPHET UR QL: NOT DETECTED
ERYTHROCYTE [DISTWIDTH] IN BLOOD BY AUTOMATED COUNT: 13.2 % (ref 10–15)
GLUCOSE UR STRIP-MCNC: NEGATIVE MG/DL
HCT VFR BLD AUTO: 41.6 % (ref 35–47)
HGB BLD-MCNC: 14.1 G/DL (ref 11.7–15.7)
HGB UR QL STRIP: NEGATIVE
KETONES UR STRIP-MCNC: NEGATIVE MG/DL
LEUKOCYTE ESTERASE UR QL STRIP: NEGATIVE
MCH RBC QN AUTO: 29.3 PG (ref 26.5–33)
MCHC RBC AUTO-ENTMCNC: 33.9 G/DL (ref 31.5–36.5)
MCV RBC AUTO: 86 FL (ref 78–100)
METHADONE UR QL SCN: NOT DETECTED
MUCOUS THREADS #/AREA URNS LPF: PRESENT /LPF
NITRATE UR QL: NEGATIVE
OPIATES UR QL SCN: NOT DETECTED
OXYCODONE UR QL SCN: NOT DETECTED
PCP UR QL SCN: NOT DETECTED
PH UR STRIP: 6 [PH] (ref 4.7–8)
PLATELET # BLD AUTO: 359 10E3/UL (ref 150–450)
PROPOXYPH UR QL: NOT DETECTED
RBC # BLD AUTO: 4.82 10E6/UL (ref 3.8–5.2)
RBC URINE: 0 /HPF
SP GR UR STRIP: 1.03 (ref 1–1.03)
SQUAMOUS EPITHELIAL: 2 /HPF
TRICYCLICS UR QL SCN: NOT DETECTED
UROBILINOGEN UR STRIP-MCNC: NORMAL MG/DL
WBC # BLD AUTO: 14.1 10E3/UL (ref 4–11)
WBC URINE: 1 /HPF

## 2022-09-06 PROCEDURE — 86900 BLOOD TYPING SEROLOGIC ABO: CPT | Performed by: OBSTETRICS & GYNECOLOGY

## 2022-09-06 PROCEDURE — 86850 RBC ANTIBODY SCREEN: CPT | Performed by: OBSTETRICS & GYNECOLOGY

## 2022-09-06 PROCEDURE — 80306 DRUG TEST PRSMV INSTRMNT: CPT | Performed by: OBSTETRICS & GYNECOLOGY

## 2022-09-06 PROCEDURE — 86901 BLOOD TYPING SEROLOGIC RH(D): CPT | Performed by: OBSTETRICS & GYNECOLOGY

## 2022-09-06 PROCEDURE — 87340 HEPATITIS B SURFACE AG IA: CPT | Performed by: OBSTETRICS & GYNECOLOGY

## 2022-09-06 PROCEDURE — 86780 TREPONEMA PALLIDUM: CPT | Performed by: OBSTETRICS & GYNECOLOGY

## 2022-09-06 PROCEDURE — 86762 RUBELLA ANTIBODY: CPT | Performed by: OBSTETRICS & GYNECOLOGY

## 2022-09-06 PROCEDURE — 86803 HEPATITIS C AB TEST: CPT | Performed by: OBSTETRICS & GYNECOLOGY

## 2022-09-06 PROCEDURE — 87086 URINE CULTURE/COLONY COUNT: CPT | Performed by: OBSTETRICS & GYNECOLOGY

## 2022-09-06 PROCEDURE — 99207 PR FIRST OB VISIT: CPT | Performed by: OBSTETRICS & GYNECOLOGY

## 2022-09-06 PROCEDURE — 36415 COLL VENOUS BLD VENIPUNCTURE: CPT | Performed by: OBSTETRICS & GYNECOLOGY

## 2022-09-06 PROCEDURE — 81001 URINALYSIS AUTO W/SCOPE: CPT | Mod: 59 | Performed by: OBSTETRICS & GYNECOLOGY

## 2022-09-06 PROCEDURE — 76817 TRANSVAGINAL US OBSTETRIC: CPT | Performed by: OBSTETRICS & GYNECOLOGY

## 2022-09-06 PROCEDURE — 87389 HIV-1 AG W/HIV-1&-2 AB AG IA: CPT | Performed by: OBSTETRICS & GYNECOLOGY

## 2022-09-06 PROCEDURE — 85027 COMPLETE CBC AUTOMATED: CPT | Performed by: OBSTETRICS & GYNECOLOGY

## 2022-09-06 RX ORDER — PRENATAL VIT/IRON FUM/FOLIC AC 27MG-0.8MG
1 TABLET ORAL DAILY
COMMUNITY

## 2022-09-06 ASSESSMENT — PAIN SCALES - GENERAL: PAINLEVEL: NO PAIN (0)

## 2022-09-06 NOTE — NURSING NOTE
"Chief Complaint   Patient presents with     Prenatal Care       Initial /60 (BP Location: Left arm, Patient Position: Sitting, Cuff Size: Adult Regular)   Pulse 86   Resp 18   Ht 1.6 m (5' 3\")   Wt 80.3 kg (177 lb)   LMP 07/08/2022   SpO2 96%   BMI 31.35 kg/m   Estimated body mass index is 31.35 kg/m  as calculated from the following:    Height as of this encounter: 1.6 m (5' 3\").    Weight as of this encounter: 80.3 kg (177 lb).  Medication Reconciliation: complete  TRISTAN LOPEZ LPN    "

## 2022-09-06 NOTE — LETTER
September 8, 2022          Eli Al  [address]  [address]   [facility] [address]        Dear Eli,             Your New OB labs are all normal. If you have any questions please call [phone].        Resulted Orders   Rubella Antibody IgG   Result Value Ref Range    Rubella Selena IgG Instrument Value 4.42 <0.90 Index    Rubella Antibody IgG Positive       Comment:      Suggests previous exposure or immunization and probable immunity.   Urine Culture   Result Value Ref Range    Culture <10,000 CFU/mL Mixture of urogenital agueda    UA with Microscopic   Result Value Ref Range    Color Urine Yellow Colorless, Straw, Light Yellow, Yellow    Appearance Urine Clear Clear    Glucose Urine Negative Negative mg/dL    Bilirubin Urine Negative Negative    Ketones Urine Negative Negative mg/dL    Specific Gravity Urine 1.028 1.003 - 1.035    Blood Urine Negative Negative    pH Urine 6.0 4.7 - 8.0    Protein Albumin Urine 10  (A) Negative mg/dL    Urobilinogen Urine Normal Normal, 2.0 mg/dL    Nitrite Urine Negative Negative    Leukocyte Esterase Urine Negative Negative    Bacteria Urine Few (A) None Seen /HPF    Mucus Urine Present (A) None Seen /LPF    RBC Urine 0 <=2 /HPF    WBC Urine 1 <=5 /HPF    Squamous Epithelials Urine 2 (H) <=1 /HPF   CBC with platelets   Result Value Ref Range    WBC Count 14.1 (H) 4.0 - 11.0 10e3/uL    RBC Count 4.82 3.80 - 5.20 10e6/uL    Hemoglobin 14.1 11.7 - 15.7 g/dL    Hematocrit 41.6 35.0 - 47.0 %    MCV 86 78 - 100 fL    MCH 29.3 26.5 - 33.0 pg    MCHC 33.9 31.5 - 36.5 g/dL    RDW 13.2 10.0 - 15.0 %    Platelet Count 359 150 - 450 10e3/uL   Urine Drugs of Abuse Screen Panel 13   Result Value Ref Range    Cannabinoids (66-mko-9-carboxy-9-THC) Not Detected Not Detected, Indeterminate      Comment:      Cutoff for a negative cannabinoid is 50 ng/mL or less.    Phencyclidine Not Detected Not Detected, Indeterminate      Comment:      Cutoff for a negative PCP is 25 ng/mL or  less.    Cocaine (Benzoylecgonine) Not Detected Not Detected, Indeterminate      Comment:      Cutoff for a negative cocaine is 150 ng/ml or less.    Methamphetamine (d-Methamphetamine) Not Detected Not Detected, Indeterminate      Comment:      Cutoff for a negative methamphetamine is 500 ng/ml or less.    Opiates (Morphine) Not Detected Not Detected, Indeterminate      Comment:      Cutoff for a negative opiate is 100 ng/ml or less.    Amphetamine (d-Amphetamine) Not Detected Not Detected, Indeterminate      Comment:      Cutoff for a negative amphetamine is 500 ng/mL or less.    Benzodiazepines (Nordiazepam) Not Detected Not Detected, Indeterminate      Comment:      Cutoff for a negative benzodiazepine is 150 ng/ml or less.    Tricyclic Antidepressants (Desipramine) Not Detected Not Detected, Indeterminate      Comment:      Cutoff for a negative tricyclic antidepressant is 300 ng/ml or less.    Methadone Not Detected Not Detected, Indeterminate      Comment:      Cutoff for a negative methadone is 200 ng/ml or less.    Barbiturates (Butalbital) Not Detected Not Detected, Indeterminate      Comment:      Cutoff for a negative barbituate is 200 ng/ml or less.    Oxycodone Not Detected Not Detected, Indeterminate      Comment:      Cutoff for a negative oxycodone is 100 ng/mL or less.    Propoxyphene (Norpropoxyphene) Not Detected Not Detected, Indeterminate      Comment:      Cutoff for a negative propoxyphene is 300 ng/ml or less.    Buprenorphine Not Detected Not Detected, Indeterminate      Comment:      Cutoff for a negative buprenorphine is 10 ng/ml or less.   Adult Type and Screen   Result Value Ref Range    ABO/RH(D) O POS     Antibody Screen Negative Negative    SPECIMEN EXPIRATION DATE [id number]               Sincerely,      Fermin Martinez MD/ Edith KWON

## 2022-09-06 NOTE — PROGRESS NOTES
"  HPI:  Eli Al is a 37 year old female  Patient's last menstrual period was 2022 (exact date). at Unknown, Estimated Date of Delivery: Data Unavailable.  She denies vaginal bleeding, nausea , vomiting and abdominal pain.   No other c/o.  H/o 1 very early SAB.      No past medical history on file.  TBI    No past surgical history on file.    Allergies: Morphine     ROS:   Denies fever, wt loss   Neg /GI other than per HPI      EXAM:  Blood pressure 120/60, pulse 86, resp. rate 18, height 1.6 m (5' 3\"), weight 80.3 kg (177 lb), last menstrual period 2022, SpO2 96 %.   BMI= Body mass index is 31.35 kg/m .  General - pleasant female in no acute distress.  Abdomen - soft, nontender, nondistended, no hepatosplenomegaly.  Pelvic - EG: normal adult female, BUS: within normal limits,Rectovaginal - deferred.    US:    Transvaginal:Yes  Yolk sac present:Yes  CRL:  25mm  FCA present:Yes  EGA 8w 4d  JEREMY:cwd          ASSESSMENT/PLAN:  Viable IUP with concordant dates.  AMA      1st Trimester precautions and testing discussed.  New OB Labs ordered and exam scheduled.  Aneuploidy testing options discussed. Recommend baby asa starting second trimester for preeclampsia prevention.   Patient has my card and phone number to call prn if problems in interim.    Fermin Martinez MD  "

## 2022-09-07 LAB
HBV SURFACE AG SERPL QL IA: NONREACTIVE
HCV AB SERPL QL IA: NONREACTIVE
HIV 1+2 AB+HIV1 P24 AG SERPL QL IA: NONREACTIVE
RUBV IGG SERPL QL IA: 4.42 INDEX
RUBV IGG SERPL QL IA: POSITIVE

## 2022-09-08 LAB
BACTERIA UR CULT: NORMAL
T PALLIDUM AB SER QL: NONREACTIVE

## 2022-09-13 ENCOUNTER — TRANSFERRED RECORDS (OUTPATIENT)
Dept: HEALTH INFORMATION MANAGEMENT | Facility: CLINIC | Age: 37
End: 2022-09-13

## 2022-10-04 ENCOUNTER — PRENATAL OFFICE VISIT (OUTPATIENT)
Dept: OBGYN | Facility: OTHER | Age: 37
End: 2022-10-04
Attending: NURSE PRACTITIONER
Payer: COMMERCIAL

## 2022-10-04 VITALS
HEIGHT: 63 IN | OXYGEN SATURATION: 97 % | DIASTOLIC BLOOD PRESSURE: 70 MMHG | SYSTOLIC BLOOD PRESSURE: 110 MMHG | RESPIRATION RATE: 12 BRPM | WEIGHT: 177.8 LBS | BODY MASS INDEX: 31.5 KG/M2 | HEART RATE: 89 BPM

## 2022-10-04 DIAGNOSIS — O09.522 MULTIGRAVIDA OF ADVANCED MATERNAL AGE IN SECOND TRIMESTER: ICD-10-CM

## 2022-10-04 DIAGNOSIS — Z11.3 SCREEN FOR STD (SEXUALLY TRANSMITTED DISEASE): ICD-10-CM

## 2022-10-04 DIAGNOSIS — O34.40 HISTORY OF LOOP ELECTROSURGICAL EXCISION PROCEDURE (LEEP) OF CERVIX AFFECTING PREGNANCY, ANTEPARTUM: ICD-10-CM

## 2022-10-04 DIAGNOSIS — Z23 NEED FOR PROPHYLACTIC VACCINATION AND INOCULATION AGAINST INFLUENZA: ICD-10-CM

## 2022-10-04 DIAGNOSIS — O09.521 MULTIGRAVIDA OF ADVANCED MATERNAL AGE IN FIRST TRIMESTER: ICD-10-CM

## 2022-10-04 DIAGNOSIS — O99.612 CONSTIPATION DURING PREGNANCY IN SECOND TRIMESTER: Primary | ICD-10-CM

## 2022-10-04 DIAGNOSIS — Z98.890 HISTORY OF LOOP ELECTROSURGICAL EXCISION PROCEDURE (LEEP) OF CERVIX AFFECTING PREGNANCY, ANTEPARTUM: ICD-10-CM

## 2022-10-04 DIAGNOSIS — K59.00 CONSTIPATION DURING PREGNANCY IN SECOND TRIMESTER: Primary | ICD-10-CM

## 2022-10-04 PROCEDURE — 90686 IIV4 VACC NO PRSV 0.5 ML IM: CPT | Performed by: NURSE PRACTITIONER

## 2022-10-04 PROCEDURE — 87491 CHLMYD TRACH DNA AMP PROBE: CPT | Performed by: NURSE PRACTITIONER

## 2022-10-04 PROCEDURE — 36415 COLL VENOUS BLD VENIPUNCTURE: CPT | Performed by: NURSE PRACTITIONER

## 2022-10-04 PROCEDURE — 87591 N.GONORRHOEAE DNA AMP PROB: CPT | Performed by: NURSE PRACTITIONER

## 2022-10-04 PROCEDURE — 90471 IMMUNIZATION ADMIN: CPT | Performed by: NURSE PRACTITIONER

## 2022-10-04 PROCEDURE — 99207 PR PRENATAL VISIT: CPT | Performed by: NURSE PRACTITIONER

## 2022-10-04 RX ORDER — DOCUSATE SODIUM 100 MG/1
100 CAPSULE, LIQUID FILLED ORAL 2 TIMES DAILY PRN
Qty: 90 CAPSULE | Refills: 1 | Status: SHIPPED | OUTPATIENT
Start: 2022-10-04

## 2022-10-04 ASSESSMENT — PAIN SCALES - GENERAL: PAINLEVEL: NO PAIN (0)

## 2022-10-04 NOTE — PROGRESS NOTES
Have you had or do you currently have:    - Diabetes? N  - Hypertension? N  - Heart disease, mitral valve prolapse, or rheumatic fever?  N  - An autoimmune disease such as lupus or rheumatoid arthritis?  N  - Kidney disease, urinary tract infection?  Y  - Epilepsy, seizures, or spells?  N  - Migraine headaches?  Y  - Any other neurological problems?  Y, TBI  - Have you ever been treated for depression?  Y  - Are you having problems with crying spells or loss of self-esteem?  N  - Have you ever required psychiatric care?  N  - Have you ever had hepatitis, liver disease, or jaundice?  N  - Have you ever been treated for blood clots in your veins, deep vein thrombosis, inflammation in the veins, thrombosis, phelbitis, pulmonary embolism or varicosities?  N  - Have you had excessive bleeding after surgery or dental work?  N  - Do you bleed more than other women after a cut or scratch?  N  - Do you have a history or anemia?  N  - Have you ever had thyroid problems or take thyroid medication?  N  - Do you have any endocrine problems?  N  - Have you every been in a major accident or suffered serious trauma?  N  - Within the last year, has anyone hit, slapped, kicked, or otherwise hurt you?  N  - In the last year, has anyone forced you to have sex when you didn't want to?  N  - Have you every received a blood transfusion?  Y  - Would you refuse a blood transfusion if a doctor judged it to be medically necessary?  N  - Does anyone in your home smoke? N  - Do you use tobacco products?  Y, VAPING  - Do you drink beer, wine, or hard liquor?  N  - Do you use any of the following: marijuana, speed, cocaine, heroin, hallucinogens, or other drugs?  N  - Is your blood type RH negative?  N  - Have you ever had asthma?  N  - Have you ever had tuberculosis?  N  - Do you have any allergies to drugs or over-the-counter medications?  N  - Allergies: dust mites, aspartame, ethanol, venlafaxine hydrochloride, sertraline?  Y  - Have you had  any breast problems?  N  - Have you ever breast-fed?  N  - Have you had any gynecological surgical procedures such as cervical conization, LEEP, laser treatment, cryosurgery of the cervix, or a dilatation and curettage, etc?  Y, LEEP  - Have you ever had any other surgical procedures?  Y  - Have you ever had any anesthetic complications?  N  - Have you ever had an abnormal pap smear?  Y  - Do you have a history of abnormalities of the uterus? N  - Did your mother take MANDA or any other hormones when she was pregnant with you?  N  - Did it take you more than one year to become pregnant?  N  - Have you ever been evaluated or treated for infertility?  N  - Is there a history of medical problems in your family, which you feel might adversely affect your health or pregnancy?  N  - Do you have any other problems we have not asked about which you feel may be important to this pregnancy?  N    Symptoms since last menstrual period  - Do you currently have any of the following symptoms: abdominal pain, blood in the stool or urine, chest pain, shortness of breath, coughing or vomiting up blood, you heart is racing or skipping beats, nausea and vomiting, pain on urination, or vaginal discharge or bleeding?  N    Genetic screening  Has the patient, baby's father, or anyone in either family had:  - Thalassemia (Italian, Greek, Mediterranean, or  background only) and an MCV result less than 80?  N  - Neural tube defect such as meningomyelocele, spina bifida, or anencephaly?  N  - Congential heart defect?  Y, 1/2 SISTER GENETIC AND CARDIAC  - Down's syndrome?  N  - Saran-Sachs disease ( Christianity, Cajun, [place])?  N  - Sickle cell disease or trait () ?  N  - Hemophilia or other inherited problems of blood?  N  - Muscular dystrophy?  N  - Cystic fibrosis?  N  - Leland's chorea?  N  - Mental retardation/autism?  N   If yes, was the person tested for Fragile X?  N  - Any other inherited genetic or chromosomal  disorder?  N  - Maternal metabolic disorder (e.g. insulin- dependent diabetes, PKU)?  N  - A child with birth defects not listed above?  N  - Recurrent pregnancy loss, or a stillbirth?  N  - Has the patient had any medications/street drugs/alcohol since her last menstrual period?  N  - Does the patient or baby's father have any other genetic risk?  N    Infection history  - Have you ever been treated for tuberculosis?  N  - Have you every had a positive skin test for tuberculosis?  N  - Do you live with someone who has tuberculosis?  N  - Have you ever been exposed to tuberculosis?  N  - Do you have genital herpes?  N  - Does your partner have genital herpes?  N  - Have you had a rash or viral illness since your last period?  N  - Have you ever had gonorrhea, chlamydia, syphilis, venereal warts, trichomoniasis, pelvic inflammatory disease, or any other sexually transmitted disease?  N  - Have you had chicken pox?  Y  - Have you been vaccinated against chicken pox?  N  - Have you had any other infectious diseases?  N

## 2022-10-04 NOTE — NURSING NOTE
"Chief Complaint   Patient presents with     Prenatal Care     12 weeks 4 days       Initial /70 (BP Location: Right arm, Patient Position: Sitting, Cuff Size: Adult Regular)   Pulse 89   Resp 12   Ht 1.6 m (5' 3\")   Wt 80.6 kg (177 lb 12.8 oz)   LMP 07/08/2022 (Exact Date)   SpO2 97%   BMI 31.50 kg/m   Estimated body mass index is 31.5 kg/m  as calculated from the following:    Height as of this encounter: 1.6 m (5' 3\").    Weight as of this encounter: 80.6 kg (177 lb 12.8 oz).  Medication Reconciliation: complete  Kayleigh Barnes RN    "

## 2022-10-05 LAB
C TRACH DNA SPEC QL PROBE+SIG AMP: NEGATIVE
N GONORRHOEA DNA SPEC QL NAA+PROBE: NEGATIVE

## 2022-10-05 ASSESSMENT — PATIENT HEALTH QUESTIONNAIRE - PHQ9: SUM OF ALL RESPONSES TO PHQ QUESTIONS 1-9: 2

## 2022-10-05 NOTE — PROGRESS NOTES
"  HPI:  Eli Al is a 37 year old female Patient's last menstrual period was 07/08/2022 (exact date). at 12w5d, Estimated Date of Delivery: Apr 14, 2023.  She denies vaginal bleeding, nausea , vomiting and abdominal pain. [facility]. Hx of TBI.   No other c/o.    No past medical history on file.    No past surgical history on file.    Allergies: Morphine     EXAM:  Blood pressure 110/70, pulse 89, resp. rate 12, height 1.6 m (5' 3\"), weight 80.6 kg (177 lb 12.8 oz), last menstrual period 07/08/2022, SpO2 97 %.   BMI= Body mass index is 31.5 kg/m .  General - pleasant female in no acute distress.  Neck - supple without lymphadenopathy or thyromegaly.  Lungs - clear to auscultation bilaterally.  Heart - regular rate and rhythm without murmur.  Abdomen - soft, nontender, nondistended, no hepatosplenomegaly.  Pelvic - EG: normal adult female, BUS: within normal limits, Vagina: well rugated, no discharge, Cervix: no lesions or CMT, closed/long Uterus: gravid, consistant with dates, mobile, Adnexae: no masses or tenderness.  Rectovaginal - deferred.  Musculoskeletal - no gross deformities.  Neurological - normal strength, sensation, and mental status.    Doptones were 158    ASSESSMENT/PLAN:  (O99.612,  K59.00) Constipation during pregnancy in second trimester  (primary encounter diagnosis)  Comment:   Plan: docusate sodium (COLACE) 100 MG capsule            (Z11.3) Screen for STD (sexually transmitted disease)  Comment:   Plan: Chlamydia trachomatis/Neisseria gonorrhoeae by         PCR, CANCELED: [facility]vasive Prenatal         Screening            (O09.521) Multigravida of advanced maternal age in first trimester  Comment:   Plan: [facility] Non[facility]vasive Prenatal Screening, US OB         Transvaginal Only ([place]), MFM Telemedicine         US Comprehensive Single            (O34.40,  Z98.890) History of LEEP (loop electrosurgical excision procedure) of cervix complicating " pregnancy  Comment:   Plan: US OB Transvaginal Only ([place])            (Z23) Need for prophylactic vaccination and inoculation against influenza  Comment:   Plan: INFLUENZA VACCINE IM > 6 MONTHS VALENT IIV4         (AFLURIA/FLUZONE)            (O09.522) Multigravida of advanced maternal age in second trimester  Comment:   Plan: new ob teaching completed  Return in 4 weeks.      Weight gain and exercise during pregnancy was discussed at today's visit.  The patient will return to clinic in 4 weeks for continued prenatal care.

## 2022-10-17 LAB — SCANNED LAB RESULT: NORMAL

## 2022-10-31 ENCOUNTER — HOSPITAL ENCOUNTER (OUTPATIENT)
Dept: ULTRASOUND IMAGING | Facility: HOSPITAL | Age: 37
Discharge: HOME | End: 2022-10-31
Attending: NURSE PRACTITIONER | Admitting: NURSE PRACTITIONER
Payer: COMMERCIAL

## 2022-10-31 DIAGNOSIS — Z98.890 HISTORY OF LOOP ELECTROSURGICAL EXCISION PROCEDURE (LEEP) OF CERVIX AFFECTING PREGNANCY, ANTEPARTUM: ICD-10-CM

## 2022-10-31 DIAGNOSIS — O09.521 MULTIGRAVIDA OF ADVANCED MATERNAL AGE IN FIRST TRIMESTER: ICD-10-CM

## 2022-10-31 DIAGNOSIS — O34.40 HISTORY OF LOOP ELECTROSURGICAL EXCISION PROCEDURE (LEEP) OF CERVIX AFFECTING PREGNANCY, ANTEPARTUM: ICD-10-CM

## 2022-10-31 PROCEDURE — 76817 TRANSVAGINAL US OBSTETRIC: CPT

## 2022-11-01 ENCOUNTER — PRENATAL OFFICE VISIT (OUTPATIENT)
Dept: OBGYN | Facility: OTHER | Age: 37
End: 2022-11-01
Attending: NURSE PRACTITIONER
Payer: COMMERCIAL

## 2022-11-01 VITALS
SYSTOLIC BLOOD PRESSURE: 115 MMHG | BODY MASS INDEX: 31.11 KG/M2 | DIASTOLIC BLOOD PRESSURE: 79 MMHG | OXYGEN SATURATION: 98 % | HEART RATE: 89 BPM | HEIGHT: 63 IN | WEIGHT: 175.6 LBS

## 2022-11-01 DIAGNOSIS — Z22.322 MRSA (METHICILLIN RESISTANT STAPH AUREUS) CULTURE POSITIVE: Primary | ICD-10-CM

## 2022-11-01 DIAGNOSIS — O09.522 MULTIGRAVIDA OF ADVANCED MATERNAL AGE IN SECOND TRIMESTER: ICD-10-CM

## 2022-11-01 PROCEDURE — 87081 CULTURE SCREEN ONLY: CPT | Performed by: NURSE PRACTITIONER

## 2022-11-01 PROCEDURE — 87077 CULTURE AEROBIC IDENTIFY: CPT | Performed by: NURSE PRACTITIONER

## 2022-11-01 PROCEDURE — 99207 PR PRENATAL VISIT: CPT | Performed by: NURSE PRACTITIONER

## 2022-11-01 PROCEDURE — 87186 SC STD MICRODIL/AGAR DIL: CPT | Performed by: NURSE PRACTITIONER

## 2022-11-01 RX ORDER — ASPIRIN 81 MG/1
81 TABLET ORAL DAILY
Status: ON HOLD | COMMUNITY
End: 2023-04-16

## 2022-11-01 ASSESSMENT — PAIN SCALES - GENERAL: PAINLEVEL: NO PAIN (0)

## 2022-11-01 NOTE — NURSING NOTE
"Chief Complaint   Patient presents with     Prenatal Care     16 weeks 4 days       Initial /79 (BP Location: Left arm, Patient Position: Sitting, Cuff Size: Adult Regular)   Pulse 89   Ht 1.6 m (5' 3\")   Wt 79.7 kg (175 lb 9.6 oz)   LMP 07/08/2022 (Exact Date)   SpO2 98%   BMI 31.11 kg/m   Estimated body mass index is 31.11 kg/m  as calculated from the following:    Height as of this encounter: 1.6 m (5' 3\").    Weight as of this encounter: 79.7 kg (175 lb 9.6 oz).  Medication Reconciliation: complete     Michelle Norman LPN    "

## 2022-11-02 NOTE — PATIENT INSTRUCTIONS
"BREASTFEEDING TIPS  1. Breastfeed every 2-4 hours. If your baby is sleepy - use breast compression, push on chin to \"start up\" baby, switch breasts, undress to diaper and wake before relatching.   Some babies \"cluster\" feed every 1 hour for a while- this is normal. Feed your baby whenever he/she is awake-  even if every hour for a while. This frequent feeding will help you make more milk and encourage your baby to sleep for longer stretches later in the evening or night.    - Position your baby close to you with pillows so he/she is facing you -belly to belly laying horizontally across your lap at the level of your breast and looking a bit \"upwards\" to your breast   -One hand holds the baby's neck behind the ears and the other hand holds your breast  -Baby's nose should start out pointing to your nipple before latching  - Hold your breast in a \"sandwich\" position by gently squeezing your breast in an oval shape and make sure your hands are not covering the areola  This \"nipple sandwich\" will make it easier for your breast to fit inside the baby's mouth-making latching more comfortable for you and baby and preventing sore nipples. Your baby should take a \"mouthful\" of breast!  - You may want to use hand expression to \"prime the pump\" and get a drip of milk out on your nipple to wake baby   (see website: newborns.[employer].edu/Breastfeeding/HandExpression.html)  - Swipe your nipple on baby's upper lip and wait for a BIG open mouth  - YOU bring baby to the breast (hold baby's neck with your fingers just below the ears) and bring baby's head to the breast--leading with the chin.  Try to avoid pushing your breast into baby's mouth- bring baby to you instead!  - Aim to get your baby's bottom lip LOW DOWN ON AREOLA (baby's upper lip just needs to \"clear\" the nipple) .   Your baby should latch onto the areola and NOT just the nipple. That way your baby gets more milk and you don't get sore nipples!      Useful web " sites:  Www.infantrisk.com  Www.aap.org  Www.ibreastfeeding.com  Www.health.[facility].mn.us

## 2022-11-02 NOTE — PROGRESS NOTES
Doing well.  Feeling flutters.  Denies cramping or bleeding.  Cervical length US reviewed.  20 week anatomy screen discussed and previously scheduled.  Public health referral placed by pt request.  Mid pregnancy changes discussed.  Return in 4 weeks.

## 2022-11-04 ENCOUNTER — TELEPHONE (OUTPATIENT)
Dept: OBGYN | Facility: OTHER | Age: 37
End: 2022-11-04

## 2022-11-04 DIAGNOSIS — Z22.322 MRSA CARRIER: Primary | ICD-10-CM

## 2022-11-04 LAB — BACTERIA SPEC CULT: ABNORMAL

## 2022-11-04 NOTE — TELEPHONE ENCOUNTER
We need to coordinate with infection control to discuss decolonization but she is out of office today.  Will check with her on recommendations and if decolonization is recommended and get back to the patient next week.  She does not currently have an active infection and has likely been carrying MRSA for a long time.       Candi Meier

## 2022-11-04 NOTE — TELEPHONE ENCOUNTER
Patient is concerned about the MRSA still being positive. Wondering if there is any treatment that she can do to try and clear this?

## 2022-11-04 NOTE — TELEPHONE ENCOUNTER
Patient notified and understands that we will call her back once I am able to get in touch with Yumiko in infection control.

## 2022-11-16 ENCOUNTER — APPOINTMENT (OUTPATIENT)
Dept: MATERNAL FETAL MEDICINE | Facility: CLINIC | Age: 37
End: 2022-11-16
Attending: NURSE PRACTITIONER
Payer: COMMERCIAL

## 2022-11-16 ENCOUNTER — HOSPITAL ENCOUNTER (OUTPATIENT)
Dept: ULTRASOUND IMAGING | Facility: OTHER | Age: 37
Discharge: HOME | End: 2022-11-16
Attending: NURSE PRACTITIONER | Admitting: NURSE PRACTITIONER
Payer: COMMERCIAL

## 2022-11-16 ENCOUNTER — HOSPITAL ENCOUNTER (OUTPATIENT)
Dept: ULTRASOUND IMAGING | Facility: CLINIC | Age: 37
Discharge: HOME | End: 2022-11-16
Attending: NURSE PRACTITIONER
Payer: COMMERCIAL

## 2022-11-16 DIAGNOSIS — O09.521 MULTIGRAVIDA OF ADVANCED MATERNAL AGE IN FIRST TRIMESTER: ICD-10-CM

## 2022-11-16 PROCEDURE — 76811 OB US DETAILED SNGL FETUS: CPT

## 2022-11-16 PROCEDURE — 76817 TRANSVAGINAL US OBSTETRIC: CPT | Mod: 26 | Performed by: OBSTETRICS & GYNECOLOGY

## 2022-11-16 PROCEDURE — 76811 OB US DETAILED SNGL FETUS: CPT | Mod: 26 | Performed by: OBSTETRICS & GYNECOLOGY

## 2022-11-29 ENCOUNTER — PRENATAL OFFICE VISIT (OUTPATIENT)
Dept: OBGYN | Facility: OTHER | Age: 37
End: 2022-11-29
Attending: NURSE PRACTITIONER
Payer: COMMERCIAL

## 2022-11-29 VITALS
HEART RATE: 87 BPM | OXYGEN SATURATION: 98 % | BODY MASS INDEX: 31.39 KG/M2 | DIASTOLIC BLOOD PRESSURE: 60 MMHG | SYSTOLIC BLOOD PRESSURE: 110 MMHG | WEIGHT: 177.2 LBS

## 2022-11-29 DIAGNOSIS — Z22.322 MRSA CARRIER: ICD-10-CM

## 2022-11-29 DIAGNOSIS — R42 DIZZINESS: ICD-10-CM

## 2022-11-29 DIAGNOSIS — O26.812 FATIGUE IN PREGNANCY, SECOND TRIMESTER: Primary | ICD-10-CM

## 2022-11-29 DIAGNOSIS — O09.522 MULTIGRAVIDA OF ADVANCED MATERNAL AGE IN SECOND TRIMESTER: ICD-10-CM

## 2022-11-29 LAB
ALBUMIN SERPL BCG-MCNC: 3.6 G/DL (ref 3.5–5.2)
ALP SERPL-CCNC: 96 U/L (ref 35–104)
ALT SERPL W P-5'-P-CCNC: 15 U/L (ref 10–35)
ANION GAP SERPL CALCULATED.3IONS-SCNC: 9 MMOL/L (ref 7–15)
AST SERPL W P-5'-P-CCNC: 22 U/L (ref 10–35)
BILIRUB SERPL-MCNC: <0.2 MG/DL
BUN SERPL-MCNC: 10.3 MG/DL (ref 6–20)
CALCIUM SERPL-MCNC: 9.3 MG/DL (ref 8.6–10)
CHLORIDE SERPL-SCNC: 103 MMOL/L (ref 98–107)
CREAT SERPL-MCNC: 0.61 MG/DL (ref 0.51–0.95)
DEPRECATED HCO3 PLAS-SCNC: 22 MMOL/L (ref 22–29)
ERYTHROCYTE [DISTWIDTH] IN BLOOD BY AUTOMATED COUNT: 13.1 % (ref 10–15)
GFR SERPL CREATININE-BSD FRML MDRD: >90 ML/MIN/1.73M2
GLUCOSE SERPL-MCNC: 80 MG/DL (ref 70–99)
HCT VFR BLD AUTO: 39.2 % (ref 35–47)
HGB BLD-MCNC: 13.2 G/DL (ref 11.7–15.7)
MCH RBC QN AUTO: 29.4 PG (ref 26.5–33)
MCHC RBC AUTO-ENTMCNC: 33.7 G/DL (ref 31.5–36.5)
MCV RBC AUTO: 87 FL (ref 78–100)
MONOCYTES NFR BLD AUTO: NEGATIVE %
PLATELET # BLD AUTO: 329 10E3/UL (ref 150–450)
POTASSIUM SERPL-SCNC: 4.2 MMOL/L (ref 3.4–5.3)
PROT SERPL-MCNC: 6.8 G/DL (ref 6.4–8.3)
RBC # BLD AUTO: 4.49 10E6/UL (ref 3.8–5.2)
SODIUM SERPL-SCNC: 134 MMOL/L (ref 136–145)
T4 FREE SERPL-MCNC: 0.91 NG/DL (ref 0.9–1.7)
TSH SERPL DL<=0.005 MIU/L-ACNC: 3.28 UIU/ML (ref 0.3–4.2)
WBC # BLD AUTO: 14.8 10E3/UL (ref 4–11)

## 2022-11-29 PROCEDURE — 82607 VITAMIN B-12: CPT | Performed by: OBSTETRICS & GYNECOLOGY

## 2022-11-29 PROCEDURE — 36415 COLL VENOUS BLD VENIPUNCTURE: CPT | Performed by: OBSTETRICS & GYNECOLOGY

## 2022-11-29 PROCEDURE — 99207 PR COMPLICATED OB VISIT: CPT | Performed by: OBSTETRICS & GYNECOLOGY

## 2022-11-29 PROCEDURE — 84439 ASSAY OF FREE THYROXINE: CPT | Performed by: OBSTETRICS & GYNECOLOGY

## 2022-11-29 PROCEDURE — 86308 HETEROPHILE ANTIBODY SCREEN: CPT | Performed by: OBSTETRICS & GYNECOLOGY

## 2022-11-29 PROCEDURE — 82306 VITAMIN D 25 HYDROXY: CPT | Performed by: OBSTETRICS & GYNECOLOGY

## 2022-11-29 PROCEDURE — 84443 ASSAY THYROID STIM HORMONE: CPT | Performed by: OBSTETRICS & GYNECOLOGY

## 2022-11-29 PROCEDURE — 80053 COMPREHEN METABOLIC PANEL: CPT | Performed by: OBSTETRICS & GYNECOLOGY

## 2022-11-29 PROCEDURE — 85027 COMPLETE CBC AUTOMATED: CPT | Performed by: OBSTETRICS & GYNECOLOGY

## 2022-11-29 ASSESSMENT — PAIN SCALES - GENERAL: PAINLEVEL: NO PAIN (0)

## 2022-11-29 NOTE — PROGRESS NOTES
C/o fatigue, lethargy, excessive sleepiness, dizziness, generalized weakness.  No fever, chest pain, SOB, edema, focal sx.  Check CBC/metabolic panel, Mono Vit D/B12, TSH.  Denies PTL sx, vb, lof  Reviewed recent US-no concerns with anatomical survey.  Return to clinic in 4 weeks  ID appt scheduled for MRSA    Fermin Martinez MD  11/29/2022

## 2022-11-30 LAB — VIT B12 SERPL-MCNC: 458 PG/ML (ref 232–1245)

## 2022-12-01 LAB — DEPRECATED CALCIDIOL+CALCIFEROL SERPL-MC: 30 UG/L (ref 20–75)

## 2022-12-28 ENCOUNTER — PRENATAL OFFICE VISIT (OUTPATIENT)
Dept: OBGYN | Facility: OTHER | Age: 37
End: 2022-12-28
Attending: NURSE PRACTITIONER

## 2022-12-28 VITALS
HEART RATE: 88 BPM | HEIGHT: 63 IN | SYSTOLIC BLOOD PRESSURE: 111 MMHG | WEIGHT: 181.5 LBS | DIASTOLIC BLOOD PRESSURE: 76 MMHG | BODY MASS INDEX: 32.16 KG/M2 | OXYGEN SATURATION: 98 %

## 2022-12-28 DIAGNOSIS — K21.9 GASTROESOPHAGEAL REFLUX IN PREGNANCY: ICD-10-CM

## 2022-12-28 DIAGNOSIS — O99.619 GASTROESOPHAGEAL REFLUX IN PREGNANCY: ICD-10-CM

## 2022-12-28 DIAGNOSIS — O09.513 AMA (ADVANCED MATERNAL AGE) PRIMIGRAVIDA 35+, THIRD TRIMESTER: Primary | ICD-10-CM

## 2022-12-28 DIAGNOSIS — O09.522 MULTIGRAVIDA OF ADVANCED MATERNAL AGE IN SECOND TRIMESTER: ICD-10-CM

## 2022-12-28 PROCEDURE — 99207 PR PRENATAL VISIT: CPT | Performed by: NURSE PRACTITIONER

## 2022-12-28 RX ORDER — FAMOTIDINE 10 MG
10 TABLET ORAL 2 TIMES DAILY
Qty: 120 TABLET | Refills: 1 | Status: SHIPPED | OUTPATIENT
Start: 2022-12-28 | End: 2023-05-04

## 2022-12-28 ASSESSMENT — PAIN SCALES - GENERAL: PAINLEVEL: NO PAIN (0)

## 2022-12-28 NOTE — PROGRESS NOTES
Doing well.  No cramping or vb.  Baby active.  ID consult notes / plan  Reviewed.  Discussed and ordered 28 week labs.  Plan Tdap. Prenatal class information provided.  Return in 4 weeks.

## 2023-01-15 ENCOUNTER — HEALTH MAINTENANCE LETTER (OUTPATIENT)
Age: 38
End: 2023-01-15

## 2023-01-16 ENCOUNTER — TELEPHONE (OUTPATIENT)
Dept: OBGYN | Facility: OTHER | Age: 38
End: 2023-01-16

## 2023-01-16 NOTE — TELEPHONE ENCOUNTER
At patient's last OB appointment she mentioned that she is hearing her heartbeat in her ears. She is calling stating that it is getting worse. She has been checking her BP and pulse at home. She states her BP is normal low and her pulse is higher. She is wondering if she should be concerned about this?

## 2023-01-16 NOTE — TELEPHONE ENCOUNTER
No, this can be a normal finding in pregnancy.  We will be happy to discuss this further with you at your next appointment.   Gamaliel Baca MD     Patient notified.

## 2023-01-26 ENCOUNTER — APPOINTMENT (OUTPATIENT)
Dept: LAB | Facility: OTHER | Age: 38
End: 2023-01-26
Attending: NURSE PRACTITIONER
Payer: COMMERCIAL

## 2023-01-26 ENCOUNTER — PRENATAL OFFICE VISIT (OUTPATIENT)
Dept: OBGYN | Facility: OTHER | Age: 38
End: 2023-01-26
Attending: NURSE PRACTITIONER
Payer: COMMERCIAL

## 2023-01-26 VITALS
HEART RATE: 90 BPM | WEIGHT: 184 LBS | DIASTOLIC BLOOD PRESSURE: 78 MMHG | SYSTOLIC BLOOD PRESSURE: 122 MMHG | HEIGHT: 63 IN | BODY MASS INDEX: 32.6 KG/M2 | OXYGEN SATURATION: 98 %

## 2023-01-26 DIAGNOSIS — R73.09 ELEVATED GLUCOSE TOLERANCE TEST: Primary | ICD-10-CM

## 2023-01-26 DIAGNOSIS — O09.513 AMA (ADVANCED MATERNAL AGE) PRIMIGRAVIDA 35+, THIRD TRIMESTER: Primary | ICD-10-CM

## 2023-01-26 LAB
ANTIBODY SCREEN: NEGATIVE
ERYTHROCYTE [DISTWIDTH] IN BLOOD BY AUTOMATED COUNT: 13.2 % (ref 10–15)
GLUCOSE 1H P 50 G GLC PO SERPL-MCNC: 150 MG/DL (ref 70–129)
HCT VFR BLD AUTO: 38 % (ref 35–47)
HGB BLD-MCNC: 12.9 G/DL (ref 11.7–15.7)
MCH RBC QN AUTO: 29.2 PG (ref 26.5–33)
MCHC RBC AUTO-ENTMCNC: 33.9 G/DL (ref 31.5–36.5)
MCV RBC AUTO: 86 FL (ref 78–100)
PLATELET # BLD AUTO: 318 10E3/UL (ref 150–450)
RBC # BLD AUTO: 4.42 10E6/UL (ref 3.8–5.2)
SPECIMEN EXPIRATION DATE: NORMAL
WBC # BLD AUTO: 12.5 10E3/UL (ref 4–11)

## 2023-01-26 PROCEDURE — 90471 IMMUNIZATION ADMIN: CPT | Performed by: NURSE PRACTITIONER

## 2023-01-26 PROCEDURE — 90715 TDAP VACCINE 7 YRS/> IM: CPT | Performed by: NURSE PRACTITIONER

## 2023-01-26 PROCEDURE — 36415 COLL VENOUS BLD VENIPUNCTURE: CPT | Performed by: NURSE PRACTITIONER

## 2023-01-26 PROCEDURE — 85027 COMPLETE CBC AUTOMATED: CPT | Performed by: NURSE PRACTITIONER

## 2023-01-26 PROCEDURE — 82950 GLUCOSE TEST: CPT | Performed by: NURSE PRACTITIONER

## 2023-01-26 PROCEDURE — 86780 TREPONEMA PALLIDUM: CPT | Performed by: NURSE PRACTITIONER

## 2023-01-26 PROCEDURE — 99207 PR PRENATAL VISIT: CPT | Performed by: NURSE PRACTITIONER

## 2023-01-26 PROCEDURE — 86850 RBC ANTIBODY SCREEN: CPT | Performed by: NURSE PRACTITIONER

## 2023-01-26 ASSESSMENT — PAIN SCALES - GENERAL: PAINLEVEL: NO PAIN (0)

## 2023-01-26 ASSESSMENT — PATIENT HEALTH QUESTIONNAIRE - PHQ9: SUM OF ALL RESPONSES TO PHQ QUESTIONS 1-9: 5

## 2023-01-26 NOTE — PROGRESS NOTES
Doing well.  Denies problems or concerns.  28 week labs and Tdap today.  Baby active.  No cramping or bleeding. Discussed signs of PTL, third trimester changes / comfort measures, and kick counts.  scheduled for prenatal classes.  Return in 2 weeks.

## 2023-01-28 LAB — T PALLIDUM AB SER QL: NONREACTIVE

## 2023-02-08 ENCOUNTER — LAB (OUTPATIENT)
Dept: LAB | Facility: OTHER | Age: 38
End: 2023-02-08
Payer: COMMERCIAL

## 2023-02-08 DIAGNOSIS — R73.09 ELEVATED GLUCOSE TOLERANCE TEST: ICD-10-CM

## 2023-02-08 LAB
GESTATIONAL GTT 1 HR POST DOSE: 213 MG/DL (ref 60–179)
GESTATIONAL GTT 2 HR POST DOSE: 169 MG/DL (ref 60–154)
GESTATIONAL GTT 3 HR POST DOSE: 95 MG/DL (ref 60–139)
GLUCOSE P FAST SERPL-MCNC: 79 MG/DL (ref 60–94)
HOLD SPECIMEN: NORMAL

## 2023-02-08 PROCEDURE — 36415 COLL VENOUS BLD VENIPUNCTURE: CPT

## 2023-02-08 PROCEDURE — 82951 GLUCOSE TOLERANCE TEST (GTT): CPT

## 2023-02-08 PROCEDURE — 82952 GTT-ADDED SAMPLES: CPT

## 2023-02-09 DIAGNOSIS — O24.419 GESTATIONAL DIABETES: Primary | ICD-10-CM

## 2023-02-10 ENCOUNTER — PRENATAL OFFICE VISIT (OUTPATIENT)
Dept: OBGYN | Facility: OTHER | Age: 38
End: 2023-02-10
Attending: OBSTETRICS & GYNECOLOGY
Payer: COMMERCIAL

## 2023-02-10 VITALS
OXYGEN SATURATION: 96 % | WEIGHT: 185 LBS | DIASTOLIC BLOOD PRESSURE: 70 MMHG | HEIGHT: 63 IN | BODY MASS INDEX: 32.78 KG/M2 | HEART RATE: 108 BPM | SYSTOLIC BLOOD PRESSURE: 120 MMHG

## 2023-02-10 DIAGNOSIS — O24.410 DIET CONTROLLED GESTATIONAL DIABETES MELLITUS (GDM) IN THIRD TRIMESTER: Primary | ICD-10-CM

## 2023-02-10 DIAGNOSIS — R73.09 ELEVATED GLUCOSE TOLERANCE TEST: ICD-10-CM

## 2023-02-10 DIAGNOSIS — O09.513 AMA (ADVANCED MATERNAL AGE) PRIMIGRAVIDA 35+, THIRD TRIMESTER: ICD-10-CM

## 2023-02-10 PROCEDURE — 99207 PR COMPLICATED OB VISIT: CPT | Performed by: OBSTETRICS & GYNECOLOGY

## 2023-02-10 ASSESSMENT — PAIN SCALES - GENERAL: PAINLEVEL: NO PAIN (0)

## 2023-02-11 NOTE — PROGRESS NOTES
Doing well.  No concerns today except recently diagnosed with GDM.  [facility] and app[facility] scheduled later this month.  Instructed on glucometer usage, dietary counseling done and GDM/diet literature from diabetes center reviewed with pt.    Discussed kick counts and fetal movement.  RTC in 2 weeks.  Will log blood glucose values and call if out of parameters.   Denies PTL sam shen, natasha Martinez MD  2/11/2023

## 2023-02-24 ENCOUNTER — PRENATAL OFFICE VISIT (OUTPATIENT)
Dept: OBGYN | Facility: OTHER | Age: 38
End: 2023-02-24
Attending: OBSTETRICS & GYNECOLOGY
Payer: COMMERCIAL

## 2023-02-24 VITALS — SYSTOLIC BLOOD PRESSURE: 110 MMHG | BODY MASS INDEX: 33.3 KG/M2 | WEIGHT: 188 LBS | DIASTOLIC BLOOD PRESSURE: 76 MMHG

## 2023-02-24 DIAGNOSIS — O24.410 DIET CONTROLLED GESTATIONAL DIABETES MELLITUS (GDM) IN THIRD TRIMESTER: ICD-10-CM

## 2023-02-24 DIAGNOSIS — O09.513 AMA (ADVANCED MATERNAL AGE) PRIMIGRAVIDA 35+, THIRD TRIMESTER: ICD-10-CM

## 2023-02-24 DIAGNOSIS — R30.0 DYSURIA: Primary | ICD-10-CM

## 2023-02-24 LAB
ALBUMIN UR-MCNC: NEGATIVE MG/DL
APPEARANCE UR: CLEAR
BACTERIA #/AREA URNS HPF: ABNORMAL /HPF
BILIRUB UR QL STRIP: NEGATIVE
COLOR UR AUTO: ABNORMAL
GLUCOSE UR STRIP-MCNC: NEGATIVE MG/DL
HGB UR QL STRIP: NEGATIVE
KETONES UR STRIP-MCNC: NEGATIVE MG/DL
LEUKOCYTE ESTERASE UR QL STRIP: NEGATIVE
NITRATE UR QL: NEGATIVE
PH UR STRIP: 7 [PH] (ref 4.7–8)
RBC URINE: 1 /HPF
SP GR UR STRIP: 1.02 (ref 1–1.03)
SQUAMOUS EPITHELIAL: 6 /HPF
UROBILINOGEN UR STRIP-MCNC: NORMAL MG/DL
WBC URINE: 2 /HPF

## 2023-02-24 PROCEDURE — 99207 PR PRENATAL VISIT: CPT | Performed by: OBSTETRICS & GYNECOLOGY

## 2023-02-24 PROCEDURE — 81001 URINALYSIS AUTO W/SCOPE: CPT | Performed by: OBSTETRICS & GYNECOLOGY

## 2023-02-24 ASSESSMENT — PAIN SCALES - GENERAL: PAINLEVEL: NO PAIN (0)

## 2023-02-24 NOTE — PROGRESS NOTES
Doing well.  No concerns today. + dysuria.  UA ordered.  BS all nml on diet  Discussed kick counts and fetal movement.  RTC in 2 weeks  Start APFT 36 wks, growth US 36-38wks  Denies PTL sx, vb, natasha Martinez MD  2/24/2023

## 2023-02-27 ENCOUNTER — HOSPITAL ENCOUNTER (OUTPATIENT)
Facility: HOSPITAL | Age: 38
End: 2023-02-27
Admitting: OBSTETRICS & GYNECOLOGY

## 2023-02-27 ENCOUNTER — HOSPITAL ENCOUNTER (OUTPATIENT)
Facility: HOSPITAL | Age: 38
Discharge: HOME | End: 2023-02-27
Attending: OBSTETRICS & GYNECOLOGY | Admitting: OBSTETRICS & GYNECOLOGY
Payer: COMMERCIAL

## 2023-02-27 ENCOUNTER — TELEPHONE (OUTPATIENT)
Dept: OBGYN | Facility: OTHER | Age: 38
End: 2023-02-27

## 2023-02-27 VITALS
TEMPERATURE: 98 F | SYSTOLIC BLOOD PRESSURE: 113 MMHG | DIASTOLIC BLOOD PRESSURE: 75 MMHG | RESPIRATION RATE: 24 BRPM | OXYGEN SATURATION: 97 % | HEART RATE: 80 BPM

## 2023-02-27 PROCEDURE — 59025 FETAL NON-STRESS TEST: CPT | Mod: 26 | Performed by: OBSTETRICS & GYNECOLOGY

## 2023-02-27 PROCEDURE — 59025 FETAL NON-STRESS TEST: CPT

## 2023-02-27 RX ORDER — LIDOCAINE 40 MG/G
CREAM TOPICAL
Status: DISCONTINUED | OUTPATIENT
Start: 2023-02-27 | End: 2023-02-27 | Stop reason: HOSPADM

## 2023-02-27 ASSESSMENT — ACTIVITIES OF DAILY LIVING (ADL): ADLS_ACUITY_SCORE: 35

## 2023-02-27 NOTE — DISCHARGE INSTRUCTIONS

## 2023-02-27 NOTE — TELEPHONE ENCOUNTER
Patient called and was reporting abd pain that is coming and going every 2 to 3 minutes. Patient denies leaking of fluid or bleeding. Patient reports that she felt the baby moveing this morning. Recommend that the patient go to Labor and Delivery to be evaluated. Labor and delivery floor notified that patient was coming.

## 2023-02-27 NOTE — PLAN OF CARE
Eli Al is a 37 year old  and 33w3d patient came in complaining of Abdominal Pain    Patient Active Problem List   Diagnosis     MRSA (methicillin resistant staph aureus) culture positive     Cellulitis and abscess     Multigravida of advanced maternal age in second trimester       Pt discharged to home at 1320 AM and encouraged to rest and drink plenty of fluids.  Pt told to call/return if bleeding more than spotting, water breaks, contractions 3-5 minutes apart that she has to breath through.     Nursing education on slef care and comfort measures provided. Self-management instructions reviewed. New no medications given or prescribed and no therapies reviewed. AVS given and signed. All questions answered and patient verbalizes understanding.    /75 (BP Location: Right arm, Patient Position: Semi-Boland's, Cuff Size: Adult Regular)   Pulse 80   Temp 98  F (36.7  C)   Resp 24   LMP 2022 (Exact Date)   SpO2 97%     Cervical status: not examined  Fetal Assessment: Reactive    Discharge support:  Independent-Alone    OB History    Para Term  AB Living   1 0 0 0 0 0   SAB IAB Ectopic Multiple Live Births   0 0 0 0 0      # Outcome Date GA Lbr [place]/2nd Weight Sex Delivery Anes PTL Lv   1 Current                Angella Bui RN

## 2023-02-27 NOTE — PLAN OF CARE
Eli Al        NST:  reactive  Start: 1220  Stop: 1240    Physician: Dr. Baca  Reason For Test: abdominal pain  Estimated Date of Delivery: Apr 14, 2023   Gestational Age: 33w3d     Verified with second RN: Ciara Bui, RN

## 2023-02-27 NOTE — PLAN OF CARE
Patient ambulated to the unit with . Complains of abdominal pain, fetal heart tones within defined limits. Will continue to monitor.

## 2023-03-02 NOTE — PROGRESS NOTES
Name: Eli Al  Age: 37 year old  YOB: 1985   Medical Record #: [id number]     Fetal Non-Stress Test Results    NST Ordered By: Gamaliel Baca MD  NST Medical Indication: abdominal pain  Gestational Age: 33w3d       NST Start & Stop Times  NST Start Time: 1220  NST Stop Time: 1240    NST Results  Fetus A   Baseline Rate: 120  Variability: Present  Accelerations: Present  Decelerations: None  Interpretation: reactive and reassuring   Category:1            Gamaliel Baca MD  Obstetrics and Gynecology

## 2023-03-10 ENCOUNTER — PRENATAL OFFICE VISIT (OUTPATIENT)
Dept: OBGYN | Facility: OTHER | Age: 38
End: 2023-03-10
Attending: NURSE PRACTITIONER
Payer: COMMERCIAL

## 2023-03-10 VITALS — SYSTOLIC BLOOD PRESSURE: 122 MMHG | DIASTOLIC BLOOD PRESSURE: 78 MMHG | BODY MASS INDEX: 32.95 KG/M2 | WEIGHT: 186 LBS

## 2023-03-10 DIAGNOSIS — O09.513 AMA (ADVANCED MATERNAL AGE) PRIMIGRAVIDA 35+, THIRD TRIMESTER: Primary | ICD-10-CM

## 2023-03-10 DIAGNOSIS — O24.410 DIET CONTROLLED GESTATIONAL DIABETES MELLITUS (GDM) IN THIRD TRIMESTER: ICD-10-CM

## 2023-03-10 PROCEDURE — 99207 PR PRENATAL VISIT: CPT | Performed by: NURSE PRACTITIONER

## 2023-03-10 ASSESSMENT — PAIN SCALES - GENERAL: PAINLEVEL: NO PAIN (0)

## 2023-03-10 NOTE — PATIENT INSTRUCTIONS
"BREASTFEEDING TIPS  1. Breastfeed every 2-4 hours. If your baby is sleepy - use breast compression, push on chin to \"start up\" baby, switch breasts, undress to diaper and wake before relatching.   Some babies \"cluster\" feed every 1 hour for a while- this is normal. Feed your baby whenever he/she is awake-  even if every hour for a while. This frequent feeding will help you make more milk and encourage your baby to sleep for longer stretches later in the evening or night.    - Position your baby close to you with pillows so he/she is facing you -belly to belly laying horizontally across your lap at the level of your breast and looking a bit \"upwards\" to your breast   -One hand holds the baby's neck behind the ears and the other hand holds your breast  -Baby's nose should start out pointing to your nipple before latching  - Hold your breast in a \"sandwich\" position by gently squeezing your breast in an oval shape and make sure your hands are not covering the areola  This \"nipple sandwich\" will make it easier for your breast to fit inside the baby's mouth-making latching more comfortable for you and baby and preventing sore nipples. Your baby should take a \"mouthful\" of breast!  - You may want to use hand expression to \"prime the pump\" and get a drip of milk out on your nipple to wake baby   (see website: newborns.[employer].edu/Breastfeeding/HandExpression.html)  - Swipe your nipple on baby's upper lip and wait for a BIG open mouth  - YOU bring baby to the breast (hold baby's neck with your fingers just below the ears) and bring baby's head to the breast--leading with the chin.  Try to avoid pushing your breast into baby's mouth- bring baby to you instead!  - Aim to get your baby's bottom lip LOW DOWN ON AREOLA (baby's upper lip just needs to \"clear\" the nipple) .   Your baby should latch onto the areola and NOT just the nipple. That way your baby gets more milk and you don't get sore nipples!      Useful web " sites:  Www.infantrisk.com  Www.aap.org  Www.ibreastfeeding.com  Www.health.Counts include 234 beds at the [facility].mn.us

## 2023-03-10 NOTE — PROGRESS NOTES
Doing well today.  BS stable.  baby active and she denies cramping or vb.  Ordered and discussed weekly BPP's to begin next week.  Plan first with growth  Desires sterilization if CD is necessary and [facility].  Will discuss at visit next week.   also requesting referral for vasectomy consult in the event of vaginal delivery - referral done.  Signs of PTL and kick counts reviewed.  Return in 1 week.

## 2023-03-16 ENCOUNTER — HOSPITAL ENCOUNTER (OUTPATIENT)
Facility: HOSPITAL | Age: 38
Discharge: HOME | End: 2023-03-16
Attending: OBSTETRICS & GYNECOLOGY | Admitting: OBSTETRICS & GYNECOLOGY
Payer: COMMERCIAL

## 2023-03-16 ENCOUNTER — HOSPITAL ENCOUNTER (OUTPATIENT)
Facility: HOSPITAL | Age: 38
End: 2023-03-16
Admitting: OBSTETRICS & GYNECOLOGY
Payer: COMMERCIAL

## 2023-03-16 VITALS — RESPIRATION RATE: 16 BRPM | DIASTOLIC BLOOD PRESSURE: 80 MMHG | TEMPERATURE: 98 F | SYSTOLIC BLOOD PRESSURE: 117 MMHG

## 2023-03-16 PROCEDURE — 59025 FETAL NON-STRESS TEST: CPT | Mod: 26 | Performed by: OBSTETRICS & GYNECOLOGY

## 2023-03-16 PROCEDURE — 59025 FETAL NON-STRESS TEST: CPT

## 2023-03-16 NOTE — PLAN OF CARE
Fetal Non-Stress Test Results    NST Ordered By: Dr. Martinez       NST Start & Stop Times      Start- 1007    Stop- 1031          NST Results  Fetus A   Baseline Rate: 135  Accelerations: Present  Decelerations: None  Interpretation: reactive      Patient was seen on L&D unit d/t BPP of 6/8 at clinic. Consent was given to place U/S and TOCO monitor. NST reactive. Strip reviewed with MONALISA Shultz RN. Patient denies LOF and vaginal bleeding. No contractions. Feels baby move. Vitals WNL. Dr. Martinez updated and ok for discharge. Patient discharged ambulatory at 1032.

## 2023-03-16 NOTE — PROVIDER NOTIFICATION
03/16/23 1027   Provider Notification   Provider Name/Title Dr. Martinez   Method of Notification Phone   Request Evaluate - Remote   Notification Reason Status Update;Patient Arrived     Provider updated on Reactive NST. BPP of 6/8 at clinic. Dr. Martinez ok for discharge.

## 2023-03-21 ENCOUNTER — PRENATAL OFFICE VISIT (OUTPATIENT)
Dept: OBGYN | Facility: OTHER | Age: 38
End: 2023-03-21
Attending: NURSE PRACTITIONER
Payer: COMMERCIAL

## 2023-03-21 VITALS
WEIGHT: 186.9 LBS | SYSTOLIC BLOOD PRESSURE: 118 MMHG | HEART RATE: 89 BPM | HEIGHT: 63 IN | OXYGEN SATURATION: 98 % | DIASTOLIC BLOOD PRESSURE: 65 MMHG | BODY MASS INDEX: 33.12 KG/M2

## 2023-03-21 DIAGNOSIS — O09.523 MULTIGRAVIDA OF ADVANCED MATERNAL AGE IN THIRD TRIMESTER: Primary | ICD-10-CM

## 2023-03-21 PROCEDURE — 87653 STREP B DNA AMP PROBE: CPT | Performed by: NURSE PRACTITIONER

## 2023-03-21 PROCEDURE — 99207 PR PRENATAL VISIT: CPT | Performed by: NURSE PRACTITIONER

## 2023-03-21 ASSESSMENT — PAIN SCALES - GENERAL: PAINLEVEL: NO PAIN (0)

## 2023-03-21 NOTE — PROGRESS NOTES
Fetal Non-Stress Test Results    NST Ordered By: Dr. Martinez       NST Start & Stop Times  NST Start Time: 1007  NST Stop Time: 1031 (strip reviewed with MONALISA Shultz RN)                            NST Results  Fetus A   Baseline Rate: Normal  Accelerations: Present  Decelerations: None  Interpretation: reactive

## 2023-03-22 LAB — GP B STREP DNA SPEC QL NAA+PROBE: NEGATIVE

## 2023-03-22 NOTE — PATIENT INSTRUCTIONS
"BREASTFEEDING TIPS  1. Breastfeed every 2-4 hours. If your baby is sleepy - use breast compression, push on chin to \"start up\" baby, switch breasts, undress to diaper and wake before relatching.   Some babies \"cluster\" feed every 1 hour for a while- this is normal. Feed your baby whenever he/she is awake-  even if every hour for a while. This frequent feeding will help you make more milk and encourage your baby to sleep for longer stretches later in the evening or night.    - Position your baby close to you with pillows so he/she is facing you -belly to belly laying horizontally across your lap at the level of your breast and looking a bit \"upwards\" to your breast   -One hand holds the baby's neck behind the ears and the other hand holds your breast  -Baby's nose should start out pointing to your nipple before latching  - Hold your breast in a \"sandwich\" position by gently squeezing your breast in an oval shape and make sure your hands are not covering the areola  This \"nipple sandwich\" will make it easier for your breast to fit inside the baby's mouth-making latching more comfortable for you and baby and preventing sore nipples. Your baby should take a \"mouthful\" of breast!  - You may want to use hand expression to \"prime the pump\" and get a drip of milk out on your nipple to wake baby   (see website: newborns.[employer] [employer].edu/Breastfeeding/HandExpression.html)  - Swipe your nipple on baby's upper lip and wait for a BIG open mouth  - YOU bring baby to the breast (hold baby's neck with your fingers just below the ears) and bring baby's head to the breast--leading with the chin.  Try to avoid pushing your breast into baby's mouth- bring baby to you instead!  - Aim to get your baby's bottom lip LOW DOWN ON AREOLA (baby's upper lip just needs to \"clear\" the nipple) .   Your baby should latch onto the areola and NOT just the nipple. That way your baby gets more milk and you don't get sore nipples!      Useful web " sites:  Www.infantrisk.com  Www.aap.org  Www.ibreastfeeding.com  Www.health.[facility].mn.us

## 2023-03-22 NOTE — PROGRESS NOTES
Doing well.  Denies concerns.  BS stable.  Sterilizations paper reviewed and signed today.  GBS done.  Baby active.  No leaking, VB, or nereida.  Return in 1 week.

## 2023-03-23 ENCOUNTER — TRANSFERRED RECORDS (OUTPATIENT)
Dept: HEALTH INFORMATION MANAGEMENT | Facility: CLINIC | Age: 38
End: 2023-03-23

## 2023-03-27 ENCOUNTER — HOSPITAL ENCOUNTER (OUTPATIENT)
Dept: ULTRASOUND IMAGING | Facility: HOSPITAL | Age: 38
Discharge: HOME | End: 2023-03-27
Attending: NURSE PRACTITIONER | Admitting: NURSE PRACTITIONER
Payer: COMMERCIAL

## 2023-03-27 DIAGNOSIS — O24.410 DIET CONTROLLED GESTATIONAL DIABETES MELLITUS (GDM) IN THIRD TRIMESTER: ICD-10-CM

## 2023-03-27 DIAGNOSIS — O09.513 AMA (ADVANCED MATERNAL AGE) PRIMIGRAVIDA 35+, THIRD TRIMESTER: ICD-10-CM

## 2023-03-27 PROCEDURE — 76819 FETAL BIOPHYS PROFIL W/O NST: CPT

## 2023-03-28 ENCOUNTER — PRENATAL OFFICE VISIT (OUTPATIENT)
Dept: OBGYN | Facility: OTHER | Age: 38
End: 2023-03-28
Attending: OBSTETRICS & GYNECOLOGY
Payer: COMMERCIAL

## 2023-03-28 VITALS
RESPIRATION RATE: 16 BRPM | OXYGEN SATURATION: 98 % | HEART RATE: 95 BPM | WEIGHT: 187 LBS | SYSTOLIC BLOOD PRESSURE: 120 MMHG | DIASTOLIC BLOOD PRESSURE: 70 MMHG | HEIGHT: 63 IN | BODY MASS INDEX: 33.13 KG/M2

## 2023-03-28 DIAGNOSIS — Z30.2 ENCOUNTER FOR STERILIZATION: ICD-10-CM

## 2023-03-28 DIAGNOSIS — O09.523 MULTIGRAVIDA OF ADVANCED MATERNAL AGE IN THIRD TRIMESTER: ICD-10-CM

## 2023-03-28 DIAGNOSIS — O24.410 DIET CONTROLLED GESTATIONAL DIABETES MELLITUS (GDM) IN THIRD TRIMESTER: Primary | ICD-10-CM

## 2023-03-28 PROCEDURE — 99207 PR PRENATAL VISIT: CPT | Performed by: OBSTETRICS & GYNECOLOGY

## 2023-03-28 ASSESSMENT — PAIN SCALES - GENERAL: PAINLEVEL: NO PAIN (0)

## 2023-03-29 NOTE — PROGRESS NOTES
Doing well.  No concerns today.  BS all nml on diet  Discussed signs of labor and when to call or come in.  Discussed kick counts and fetal movement.  Please call if persistent HA, blurred vision, or swelling  She will report to OB if contractions every 5-10 minutes or if rupture of membranes.  RTC in 1 week  Wkly BPP scheduled  Denies regular contractions, vaginal bleeding, CIRO Martinez MD  3/29/2023

## 2023-04-03 ENCOUNTER — HOSPITAL ENCOUNTER (OUTPATIENT)
Dept: ULTRASOUND IMAGING | Facility: HOSPITAL | Age: 38
Discharge: HOME | End: 2023-04-03
Attending: NURSE PRACTITIONER | Admitting: NURSE PRACTITIONER
Payer: COMMERCIAL

## 2023-04-03 DIAGNOSIS — O09.513 AMA (ADVANCED MATERNAL AGE) PRIMIGRAVIDA 35+, THIRD TRIMESTER: ICD-10-CM

## 2023-04-03 DIAGNOSIS — O24.410 DIET CONTROLLED GESTATIONAL DIABETES MELLITUS (GDM) IN THIRD TRIMESTER: ICD-10-CM

## 2023-04-03 PROCEDURE — 76819 FETAL BIOPHYS PROFIL W/O NST: CPT

## 2023-04-07 ENCOUNTER — PRENATAL OFFICE VISIT (OUTPATIENT)
Dept: OBGYN | Facility: OTHER | Age: 38
End: 2023-04-07
Attending: OBSTETRICS & GYNECOLOGY
Payer: COMMERCIAL

## 2023-04-07 VITALS
HEIGHT: 63 IN | WEIGHT: 189 LBS | BODY MASS INDEX: 33.49 KG/M2 | SYSTOLIC BLOOD PRESSURE: 122 MMHG | DIASTOLIC BLOOD PRESSURE: 72 MMHG

## 2023-04-07 DIAGNOSIS — O09.513 AMA (ADVANCED MATERNAL AGE) PRIMIGRAVIDA 35+, THIRD TRIMESTER: ICD-10-CM

## 2023-04-07 DIAGNOSIS — O24.410 DIET CONTROLLED GESTATIONAL DIABETES MELLITUS (GDM) IN THIRD TRIMESTER: ICD-10-CM

## 2023-04-07 DIAGNOSIS — Z22.322 MRSA (METHICILLIN RESISTANT STAPH AUREUS) CULTURE POSITIVE: Primary | ICD-10-CM

## 2023-04-07 PROCEDURE — 99207 PR PRENATAL VISIT: CPT | Performed by: OBSTETRICS & GYNECOLOGY

## 2023-04-07 PROCEDURE — 87081 CULTURE SCREEN ONLY: CPT | Performed by: OBSTETRICS & GYNECOLOGY

## 2023-04-07 ASSESSMENT — PAIN SCALES - GENERAL: PAINLEVEL: NO PAIN (0)

## 2023-04-07 NOTE — PROGRESS NOTES
Doing well.  No concerns today.  BS nml  BPP nml this wk  RTC in 1 week  Denies regular contractions, vaginal bleeding, LOF  Fermin Martinez MD  4/7/2023

## 2023-04-09 LAB — BACTERIA SPEC CULT: NORMAL

## 2023-04-10 ENCOUNTER — HOSPITAL ENCOUNTER (OUTPATIENT)
Dept: ULTRASOUND IMAGING | Facility: HOSPITAL | Age: 38
Discharge: HOME | End: 2023-04-10
Attending: NURSE PRACTITIONER | Admitting: NURSE PRACTITIONER
Payer: COMMERCIAL

## 2023-04-10 DIAGNOSIS — O09.513 AMA (ADVANCED MATERNAL AGE) PRIMIGRAVIDA 35+, THIRD TRIMESTER: ICD-10-CM

## 2023-04-10 DIAGNOSIS — O24.410 DIET CONTROLLED GESTATIONAL DIABETES MELLITUS (GDM) IN THIRD TRIMESTER: ICD-10-CM

## 2023-04-10 PROCEDURE — 76819 FETAL BIOPHYS PROFIL W/O NST: CPT

## 2023-04-11 ENCOUNTER — HOSPITAL ENCOUNTER (OUTPATIENT)
Facility: HOSPITAL | Age: 38
Discharge: HOME | End: 2023-04-11
Attending: OBSTETRICS & GYNECOLOGY | Admitting: OBSTETRICS & GYNECOLOGY
Payer: COMMERCIAL

## 2023-04-11 VITALS
DIASTOLIC BLOOD PRESSURE: 80 MMHG | RESPIRATION RATE: 18 BRPM | OXYGEN SATURATION: 97 % | TEMPERATURE: 97.8 F | HEART RATE: 80 BPM | SYSTOLIC BLOOD PRESSURE: 132 MMHG

## 2023-04-11 PROCEDURE — 59025 FETAL NON-STRESS TEST: CPT

## 2023-04-11 PROCEDURE — 59025 FETAL NON-STRESS TEST: CPT | Mod: 26 | Performed by: OBSTETRICS & GYNECOLOGY

## 2023-04-11 NOTE — DISCHARGE INSTRUCTIONS

## 2023-04-11 NOTE — PLAN OF CARE
Eli Al        NST:  reactive  Start: 1200  Stop: 1230    Physician: Dr. Martinez  Reason For Test: follow-up for 6/8 BPP  Estimated Date of Delivery: Apr 14, 2023   Gestational Age: 39w4d     Verified with second RN: Lesley WALLACE    Comments:  Broken tracing, very active baby, audible accelerations noted       Ciara Shultz, RN

## 2023-04-13 ENCOUNTER — NURSE TRIAGE (OUTPATIENT)
Dept: NURSING | Facility: CLINIC | Age: 38
End: 2023-04-13

## 2023-04-13 ENCOUNTER — HOSPITAL ENCOUNTER (INPATIENT)
Facility: HOSPITAL | Age: 38
LOS: 2 days | Discharge: HOME | End: 2023-04-16
Attending: OBSTETRICS & GYNECOLOGY | Admitting: OBSTETRICS & GYNECOLOGY
Payer: COMMERCIAL

## 2023-04-13 ENCOUNTER — PRENATAL OFFICE VISIT (OUTPATIENT)
Dept: OBGYN | Facility: OTHER | Age: 38
End: 2023-04-13
Attending: OBSTETRICS & GYNECOLOGY
Payer: COMMERCIAL

## 2023-04-13 VITALS
SYSTOLIC BLOOD PRESSURE: 118 MMHG | BODY MASS INDEX: 33.49 KG/M2 | HEIGHT: 63 IN | DIASTOLIC BLOOD PRESSURE: 78 MMHG | WEIGHT: 189 LBS

## 2023-04-13 DIAGNOSIS — O24.410 DIET CONTROLLED GESTATIONAL DIABETES MELLITUS (GDM) IN THIRD TRIMESTER: ICD-10-CM

## 2023-04-13 DIAGNOSIS — O09.522 MULTIGRAVIDA OF ADVANCED MATERNAL AGE IN SECOND TRIMESTER: Primary | ICD-10-CM

## 2023-04-13 DIAGNOSIS — O09.513 AMA (ADVANCED MATERNAL AGE) PRIMIGRAVIDA 35+, THIRD TRIMESTER: Primary | ICD-10-CM

## 2023-04-13 PROCEDURE — 99207 PR PRENATAL VISIT: CPT | Performed by: OBSTETRICS & GYNECOLOGY

## 2023-04-13 ASSESSMENT — PAIN SCALES - GENERAL: PAINLEVEL: NO PAIN (0)

## 2023-04-13 NOTE — PROGRESS NOTES
Doing well.  + Ctx, Q 18min  Discussed signs of labor and when to call or come in.  FMC discussed  RTC in 1 week  Declines IOL tomorrow.  If no spontaneous labor discuss next week.   US BPP scheduled.  Denies regular contractions, vaginal bleeding, CIRO Martinez MD  4/13/2023

## 2023-04-14 ENCOUNTER — ANESTHESIA (OUTPATIENT)
Dept: OBGYN | Facility: HOSPITAL | Age: 38
End: 2023-04-14
Payer: COMMERCIAL

## 2023-04-14 ENCOUNTER — ANESTHESIA EVENT (OUTPATIENT)
Dept: OBGYN | Facility: HOSPITAL | Age: 38
End: 2023-04-14
Payer: COMMERCIAL

## 2023-04-14 PROBLEM — Z36.89 ENCOUNTER FOR TRIAGE IN PREGNANT PATIENT: Status: ACTIVE | Noted: 2023-04-14

## 2023-04-14 PROBLEM — O47.9 THREATENED LABOR AT TERM: Status: ACTIVE | Noted: 2023-04-14

## 2023-04-14 PROBLEM — O09.522 MULTIGRAVIDA OF ADVANCED MATERNAL AGE IN SECOND TRIMESTER: Status: ACTIVE | Noted: 2022-10-05

## 2023-04-14 LAB
ABO/RH(D): NORMAL
ANTIBODY SCREEN: NEGATIVE
ERYTHROCYTE [DISTWIDTH] IN BLOOD BY AUTOMATED COUNT: 13.6 % (ref 10–15)
GLUCOSE BLDC GLUCOMTR-MCNC: 121 MG/DL (ref 70–99)
HCT VFR BLD AUTO: 41.1 % (ref 35–47)
HGB BLD-MCNC: 13.7 G/DL (ref 11.7–15.7)
HOLD SPECIMEN: NORMAL
MCH RBC QN AUTO: 28.4 PG (ref 26.5–33)
MCHC RBC AUTO-ENTMCNC: 33.3 G/DL (ref 31.5–36.5)
MCV RBC AUTO: 85 FL (ref 78–100)
PLATELET # BLD AUTO: 302 10E3/UL (ref 150–450)
RBC # BLD AUTO: 4.82 10E6/UL (ref 3.8–5.2)
SPECIMEN EXPIRATION DATE: NORMAL
WBC # BLD AUTO: 17.1 10E3/UL (ref 4–11)

## 2023-04-14 PROCEDURE — 250N000013 HC RX MED GY IP 250 OP 250 PS 637: Performed by: OBSTETRICS & GYNECOLOGY

## 2023-04-14 PROCEDURE — 96372 THER/PROPH/DIAG INJ SC/IM: CPT | Performed by: OBSTETRICS & GYNECOLOGY

## 2023-04-14 PROCEDURE — 722N000001 HC LABOR CARE VAGINAL DELIVERY SINGLE

## 2023-04-14 PROCEDURE — 3E0R3NZ INTRODUCTION OF ANALGESICS, HYPNOTICS, SEDATIVES INTO SPINAL CANAL, PERCUTANEOUS APPROACH: ICD-10-PCS | Performed by: OBSTETRICS & GYNECOLOGY

## 2023-04-14 PROCEDURE — 86901 BLOOD TYPING SEROLOGIC RH(D): CPT | Performed by: OBSTETRICS & GYNECOLOGY

## 2023-04-14 PROCEDURE — 10907ZC DRAINAGE OF AMNIOTIC FLUID, THERAPEUTIC FROM PRODUCTS OF CONCEPTION, VIA NATURAL OR ARTIFICIAL OPENING: ICD-10-PCS | Performed by: OBSTETRICS & GYNECOLOGY

## 2023-04-14 PROCEDURE — 250N000011 HC RX IP 250 OP 636: Performed by: NURSE ANESTHETIST, CERTIFIED REGISTERED

## 2023-04-14 PROCEDURE — 250N000009 HC RX 250: Performed by: OBSTETRICS & GYNECOLOGY

## 2023-04-14 PROCEDURE — 59400 OBSTETRICAL CARE: CPT | Performed by: OBSTETRICS & GYNECOLOGY

## 2023-04-14 PROCEDURE — 87081 CULTURE SCREEN ONLY: CPT | Performed by: OBSTETRICS & GYNECOLOGY

## 2023-04-14 PROCEDURE — 85014 HEMATOCRIT: CPT | Performed by: OBSTETRICS & GYNECOLOGY

## 2023-04-14 PROCEDURE — 36415 COLL VENOUS BLD VENIPUNCTURE: CPT | Performed by: OBSTETRICS & GYNECOLOGY

## 2023-04-14 PROCEDURE — 120N000001 HC R&B MED SURG/OB

## 2023-04-14 PROCEDURE — 86780 TREPONEMA PALLIDUM: CPT | Performed by: OBSTETRICS & GYNECOLOGY

## 2023-04-14 PROCEDURE — 0KQM0ZZ REPAIR PERINEUM MUSCLE, OPEN APPROACH: ICD-10-PCS | Performed by: OBSTETRICS & GYNECOLOGY

## 2023-04-14 PROCEDURE — 59400 OBSTETRICAL CARE: CPT | Performed by: NURSE ANESTHETIST, CERTIFIED REGISTERED

## 2023-04-14 PROCEDURE — 250N000011 HC RX IP 250 OP 636: Performed by: OBSTETRICS & GYNECOLOGY

## 2023-04-14 RX ORDER — CARBOPROST TROMETHAMINE 250 UG/ML
250 INJECTION, SOLUTION INTRAMUSCULAR
Status: DISCONTINUED | OUTPATIENT
Start: 2023-04-14 | End: 2023-04-16 | Stop reason: HOSPADM

## 2023-04-14 RX ORDER — OXYTOCIN/0.9 % SODIUM CHLORIDE 30/500 ML
1-24 PLASTIC BAG, INJECTION (ML) INTRAVENOUS CONTINUOUS
Status: DISCONTINUED | OUTPATIENT
Start: 2023-04-14 | End: 2023-04-14

## 2023-04-14 RX ORDER — TRANEXAMIC ACID 10 MG/ML
1 INJECTION, SOLUTION INTRAVENOUS EVERY 30 MIN PRN
Status: DISCONTINUED | OUTPATIENT
Start: 2023-04-14 | End: 2023-04-14

## 2023-04-14 RX ORDER — CARBOPROST TROMETHAMINE 250 UG/ML
250 INJECTION, SOLUTION INTRAMUSCULAR
Status: DISCONTINUED | OUTPATIENT
Start: 2023-04-14 | End: 2023-04-14

## 2023-04-14 RX ORDER — DEXTROSE MONOHYDRATE 25 G/50ML
25-50 INJECTION, SOLUTION INTRAVENOUS
Status: DISCONTINUED | OUTPATIENT
Start: 2023-04-14 | End: 2023-04-16 | Stop reason: HOSPADM

## 2023-04-14 RX ORDER — ACETAMINOPHEN 325 MG/1
650 TABLET ORAL EVERY 4 HOURS PRN
Status: DISCONTINUED | OUTPATIENT
Start: 2023-04-14 | End: 2023-04-16 | Stop reason: HOSPADM

## 2023-04-14 RX ORDER — NALOXONE HYDROCHLORIDE 0.4 MG/ML
0.2 INJECTION, SOLUTION INTRAMUSCULAR; INTRAVENOUS; SUBCUTANEOUS
Status: DISCONTINUED | OUTPATIENT
Start: 2023-04-14 | End: 2023-04-16 | Stop reason: HOSPADM

## 2023-04-14 RX ORDER — METOCLOPRAMIDE 10 MG/1
10 TABLET ORAL EVERY 6 HOURS PRN
Status: DISCONTINUED | OUTPATIENT
Start: 2023-04-14 | End: 2023-04-14

## 2023-04-14 RX ORDER — DOCUSATE SODIUM 100 MG/1
100 CAPSULE, LIQUID FILLED ORAL DAILY
Status: DISCONTINUED | OUTPATIENT
Start: 2023-04-14 | End: 2023-04-16 | Stop reason: HOSPADM

## 2023-04-14 RX ORDER — OXYTOCIN 10 [USP'U]/ML
10 INJECTION, SOLUTION INTRAMUSCULAR; INTRAVENOUS
Status: DISCONTINUED | OUTPATIENT
Start: 2023-04-14 | End: 2023-04-14

## 2023-04-14 RX ORDER — ONDANSETRON 4 MG/1
4 TABLET, ORALLY DISINTEGRATING ORAL EVERY 6 HOURS PRN
Status: DISCONTINUED | OUTPATIENT
Start: 2023-04-14 | End: 2023-04-14

## 2023-04-14 RX ORDER — CITRIC ACID/SODIUM CITRATE 334-500MG
30 SOLUTION, ORAL ORAL
Status: DISCONTINUED | OUTPATIENT
Start: 2023-04-14 | End: 2023-04-14

## 2023-04-14 RX ORDER — OXYTOCIN/0.9 % SODIUM CHLORIDE 30/500 ML
100-340 PLASTIC BAG, INJECTION (ML) INTRAVENOUS CONTINUOUS PRN
Status: DISCONTINUED | OUTPATIENT
Start: 2023-04-14 | End: 2023-04-14

## 2023-04-14 RX ORDER — OXYTOCIN/0.9 % SODIUM CHLORIDE 30/500 ML
340 PLASTIC BAG, INJECTION (ML) INTRAVENOUS CONTINUOUS PRN
Status: COMPLETED | OUTPATIENT
Start: 2023-04-14 | End: 2023-04-14

## 2023-04-14 RX ORDER — FAMOTIDINE 10 MG
10 TABLET ORAL 2 TIMES DAILY
Status: DISCONTINUED | OUTPATIENT
Start: 2023-04-14 | End: 2023-04-16 | Stop reason: HOSPADM

## 2023-04-14 RX ORDER — ONDANSETRON 2 MG/ML
4 INJECTION INTRAMUSCULAR; INTRAVENOUS EVERY 6 HOURS PRN
Status: DISCONTINUED | OUTPATIENT
Start: 2023-04-14 | End: 2023-04-14

## 2023-04-14 RX ORDER — SODIUM CHLORIDE, SODIUM LACTATE, POTASSIUM CHLORIDE, CALCIUM CHLORIDE 600; 310; 30; 20 MG/100ML; MG/100ML; MG/100ML; MG/100ML
INJECTION, SOLUTION INTRAVENOUS CONTINUOUS PRN
Status: DISCONTINUED | OUTPATIENT
Start: 2023-04-14 | End: 2023-04-14

## 2023-04-14 RX ORDER — IBUPROFEN 800 MG/1
800 TABLET, FILM COATED ORAL
Status: DISCONTINUED | OUTPATIENT
Start: 2023-04-14 | End: 2023-04-14

## 2023-04-14 RX ORDER — NALOXONE HYDROCHLORIDE 0.4 MG/ML
0.4 INJECTION, SOLUTION INTRAMUSCULAR; INTRAVENOUS; SUBCUTANEOUS
Status: DISCONTINUED | OUTPATIENT
Start: 2023-04-14 | End: 2023-04-14

## 2023-04-14 RX ORDER — FENTANYL CITRATE 50 UG/ML
100 INJECTION, SOLUTION INTRAMUSCULAR; INTRAVENOUS
Status: DISCONTINUED | OUTPATIENT
Start: 2023-04-14 | End: 2023-04-14

## 2023-04-14 RX ORDER — OXYTOCIN/0.9 % SODIUM CHLORIDE 30/500 ML
340 PLASTIC BAG, INJECTION (ML) INTRAVENOUS CONTINUOUS PRN
Status: DISCONTINUED | OUTPATIENT
Start: 2023-04-14 | End: 2023-04-16 | Stop reason: HOSPADM

## 2023-04-14 RX ORDER — LIDOCAINE 40 MG/G
CREAM TOPICAL
Status: DISCONTINUED | OUTPATIENT
Start: 2023-04-14 | End: 2023-04-14

## 2023-04-14 RX ORDER — NALOXONE HYDROCHLORIDE 0.4 MG/ML
0.4 INJECTION, SOLUTION INTRAMUSCULAR; INTRAVENOUS; SUBCUTANEOUS
Status: DISCONTINUED | OUTPATIENT
Start: 2023-04-14 | End: 2023-04-16 | Stop reason: HOSPADM

## 2023-04-14 RX ORDER — MODIFIED LANOLIN
OINTMENT (GRAM) TOPICAL
Status: DISCONTINUED | OUTPATIENT
Start: 2023-04-14 | End: 2023-04-16 | Stop reason: HOSPADM

## 2023-04-14 RX ORDER — PROCHLORPERAZINE 25 MG
25 SUPPOSITORY, RECTAL RECTAL EVERY 12 HOURS PRN
Status: DISCONTINUED | OUTPATIENT
Start: 2023-04-14 | End: 2023-04-14

## 2023-04-14 RX ORDER — MISOPROSTOL 200 UG/1
400 TABLET ORAL
Status: DISCONTINUED | OUTPATIENT
Start: 2023-04-14 | End: 2023-04-16 | Stop reason: HOSPADM

## 2023-04-14 RX ORDER — KETOROLAC TROMETHAMINE 30 MG/ML
30 INJECTION, SOLUTION INTRAMUSCULAR; INTRAVENOUS
Status: DISCONTINUED | OUTPATIENT
Start: 2023-04-14 | End: 2023-04-14

## 2023-04-14 RX ORDER — BISACODYL 10 MG
10 SUPPOSITORY, RECTAL RECTAL DAILY PRN
Status: DISCONTINUED | OUTPATIENT
Start: 2023-04-14 | End: 2023-04-16 | Stop reason: HOSPADM

## 2023-04-14 RX ORDER — NALBUPHINE HYDROCHLORIDE 10 MG/ML
2.5-5 INJECTION, SOLUTION INTRAMUSCULAR; INTRAVENOUS; SUBCUTANEOUS EVERY 6 HOURS PRN
Status: DISCONTINUED | OUTPATIENT
Start: 2023-04-14 | End: 2023-04-14

## 2023-04-14 RX ORDER — HYDROXYZINE HYDROCHLORIDE 50 MG/ML
100 INJECTION, SOLUTION INTRAMUSCULAR ONCE
Status: COMPLETED | OUTPATIENT
Start: 2023-04-14 | End: 2023-04-14

## 2023-04-14 RX ORDER — NALOXONE HYDROCHLORIDE 0.4 MG/ML
0.2 INJECTION, SOLUTION INTRAMUSCULAR; INTRAVENOUS; SUBCUTANEOUS
Status: DISCONTINUED | OUTPATIENT
Start: 2023-04-14 | End: 2023-04-14

## 2023-04-14 RX ORDER — OXYTOCIN 10 [USP'U]/ML
10 INJECTION, SOLUTION INTRAMUSCULAR; INTRAVENOUS
Status: DISCONTINUED | OUTPATIENT
Start: 2023-04-14 | End: 2023-04-16 | Stop reason: HOSPADM

## 2023-04-14 RX ORDER — MISOPROSTOL 200 UG/1
400 TABLET ORAL
Status: DISCONTINUED | OUTPATIENT
Start: 2023-04-14 | End: 2023-04-14

## 2023-04-14 RX ORDER — IBUPROFEN 800 MG/1
800 TABLET, FILM COATED ORAL EVERY 6 HOURS PRN
Status: DISCONTINUED | OUTPATIENT
Start: 2023-04-14 | End: 2023-04-16 | Stop reason: HOSPADM

## 2023-04-14 RX ORDER — HYDROCORTISONE 25 MG/G
CREAM TOPICAL 3 TIMES DAILY PRN
Status: DISCONTINUED | OUTPATIENT
Start: 2023-04-14 | End: 2023-04-16 | Stop reason: HOSPADM

## 2023-04-14 RX ORDER — TRANEXAMIC ACID 10 MG/ML
1 INJECTION, SOLUTION INTRAVENOUS EVERY 30 MIN PRN
Status: DISCONTINUED | OUTPATIENT
Start: 2023-04-14 | End: 2023-04-16 | Stop reason: HOSPADM

## 2023-04-14 RX ORDER — SODIUM CHLORIDE, SODIUM LACTATE, POTASSIUM CHLORIDE, CALCIUM CHLORIDE 600; 310; 30; 20 MG/100ML; MG/100ML; MG/100ML; MG/100ML
INJECTION, SOLUTION INTRAVENOUS CONTINUOUS
Status: DISCONTINUED | OUTPATIENT
Start: 2023-04-14 | End: 2023-04-14

## 2023-04-14 RX ORDER — LIDOCAINE 40 MG/G
CREAM TOPICAL
Status: DISCONTINUED | OUTPATIENT
Start: 2023-04-14 | End: 2023-04-14 | Stop reason: HOSPADM

## 2023-04-14 RX ORDER — OXYCODONE HYDROCHLORIDE 5 MG/1
5 TABLET ORAL EVERY 4 HOURS PRN
Status: DISCONTINUED | OUTPATIENT
Start: 2023-04-14 | End: 2023-04-16 | Stop reason: HOSPADM

## 2023-04-14 RX ORDER — FENTANYL CITRATE-0.9 % NACL/PF 10 MCG/ML
100 PLASTIC BAG, INJECTION (ML) INTRAVENOUS EVERY 5 MIN PRN
Status: DISCONTINUED | OUTPATIENT
Start: 2023-04-14 | End: 2023-04-14

## 2023-04-14 RX ORDER — METHYLERGONOVINE MALEATE 0.2 MG/ML
200 INJECTION INTRAVENOUS
Status: DISCONTINUED | OUTPATIENT
Start: 2023-04-14 | End: 2023-04-16 | Stop reason: HOSPADM

## 2023-04-14 RX ORDER — NICOTINE POLACRILEX 4 MG
15-30 LOZENGE BUCCAL
Status: DISCONTINUED | OUTPATIENT
Start: 2023-04-14 | End: 2023-04-16 | Stop reason: HOSPADM

## 2023-04-14 RX ORDER — METOCLOPRAMIDE HYDROCHLORIDE 5 MG/ML
10 INJECTION INTRAMUSCULAR; INTRAVENOUS EVERY 6 HOURS PRN
Status: DISCONTINUED | OUTPATIENT
Start: 2023-04-14 | End: 2023-04-14

## 2023-04-14 RX ORDER — PROCHLORPERAZINE MALEATE 10 MG
10 TABLET ORAL EVERY 6 HOURS PRN
Status: DISCONTINUED | OUTPATIENT
Start: 2023-04-14 | End: 2023-04-14

## 2023-04-14 RX ORDER — METHYLERGONOVINE MALEATE 0.2 MG/ML
200 INJECTION INTRAVENOUS
Status: DISCONTINUED | OUTPATIENT
Start: 2023-04-14 | End: 2023-04-14

## 2023-04-14 RX ADMIN — HYDROXYZINE HYDROCHLORIDE 100 MG: 50 INJECTION, SOLUTION INTRAMUSCULAR at 00:28

## 2023-04-14 RX ADMIN — Medication 8 ML/HR: at 03:30

## 2023-04-14 RX ADMIN — Medication 340 ML/HR: at 09:02

## 2023-04-14 RX ADMIN — DOCUSATE SODIUM 100 MG: 100 CAPSULE, LIQUID FILLED ORAL at 11:33

## 2023-04-14 RX ADMIN — ACETAMINOPHEN 650 MG: 325 TABLET ORAL at 21:28

## 2023-04-14 RX ADMIN — FAMOTIDINE 10 MG: 10 TABLET, FILM COATED ORAL at 21:28

## 2023-04-14 RX ADMIN — Medication 2 MILLI-UNITS/MIN: at 07:58

## 2023-04-14 RX ADMIN — Medication: at 21:28

## 2023-04-14 RX ADMIN — IBUPROFEN 800 MG: 800 TABLET, FILM COATED ORAL at 17:59

## 2023-04-14 RX ADMIN — IBUPROFEN 800 MG: 800 TABLET, FILM COATED ORAL at 09:08

## 2023-04-14 RX ADMIN — MISOPROSTOL 800 MCG: 200 TABLET ORAL at 09:14

## 2023-04-14 RX ADMIN — FAMOTIDINE 10 MG: 10 TABLET, FILM COATED ORAL at 11:33

## 2023-04-14 ASSESSMENT — ACTIVITIES OF DAILY LIVING (ADL)
ADLS_ACUITY_SCORE: 18
ADLS_ACUITY_SCORE: 18
ADLS_ACUITY_SCORE: 35
ADLS_ACUITY_SCORE: 18
ADLS_ACUITY_SCORE: 35
ADLS_ACUITY_SCORE: 18
ADLS_ACUITY_SCORE: 35
ADLS_ACUITY_SCORE: 18
ADLS_ACUITY_SCORE: 18

## 2023-04-14 NOTE — PROGRESS NOTES
Labor Admission  Eli Al  MRN: [id number]  Gestational Age: 40w0d      Eli Al is admitted for active labor.  States nereida since 4pm on 23.  Rates pain at 8/10. Denies bleeding.  Denies LOF    Dr. Martinez notified of arrival and condition and Intrapartum orders initiated.      FHT: 140s, accels present with moderate variability.  Broken tracings due to patient refusal to have monitor on with frequent movement, pacing, and wanting to stand.    Uterine Assessment: Pt and spouse state nereida every 5 minutes for approximately 1 minute.  Moderately palpated, soft when contractions are over.     Patient is alert and oriented X 3,Patient oriented to room, unit, hourly rounding, and plan of care.  Call light within reach. Explained admission packet with patient bill of rights brochure. Will continue to monitor and document as needed.     Inpatient nursing criteria listed below was met:    Health care directives status obtained and documented: Yes     Core Measure diagnosis present:: No  Vaccine assessment done and vaccines ordered if appropriate. No  Clergy visit ordered if patient requests: N/A  Skin issues/needs documented:N/A  Isolation needs addressed, if appropriate: Yes  Fall Prevention (Med and High risk): Care plan updated, Education given and documented and signage used: Yes  Care Plan initiated: Yes  Education Documented (Reminder to educate patient if MRSA is present on admission): Yes  Education Assessment documented:Yes  Patient has discharge needs (If yes, please explain): No

## 2023-04-14 NOTE — PLAN OF CARE
B/P: 126/74, T: 98.3, P: 93, R: 20. Rates pain: 8/10. Sharp constant pain in buttocks. Pt refused tylenol. Will continue to administer ibuprofen PRN. Tucks pads given. Pt encouraged to soak in tub.     Lungs: Clear. : Voiding without difficulty. GI: Active bowel sounds. Eating and drinking without difficulty. Fundus: Midline firm without massage. Lochia: Light. Activity: normal activity and moving with difficulty due to perineal pain. Infant feeding: Breast feeding going well.         Postpartum breastfeeding assessment completed and education provided, see Patient Education Activity.  Items included in the education are:   proper positioning and latch  effectiveness of feeding  manual expression  handling and storing breastmilk  maintenance of breastfeeding for the first 6 months  sign/symptoms of infant feeding issues requiring referral to qualified health care provider  Postpartum care education provided, see Patient Education activity. Patient denies needs. Will monitor.  Blanca Stephens RN

## 2023-04-14 NOTE — PROVIDER NOTIFICATION
OB Triage Note  Eli Al  MRN: [id number]  Gestational Age: 40w0d      Eli Al presents for contractions.      States nereida since 4pm today.  Rates pain at 8/10.  Not coping well for pain and reporting contractions every 5 minutes approximately.  Patient is writhing/moving around in bed.  Denies LOF.  Denies bleeding.    MD notified of patient arrival.  Pt 1.5 cm dilation. Telephone orders to give IM vistaril 100mg and give a hot tub for comfort.  Oriented patient to surroundings. Call light within reach.     FHT: 140s, acceleration present with moderate variability.  Unable to determine decels with broken tracing.  Patient unable to lay still due to pain.  Uterine Assessment:Contractions: reported every 5 minutes for approximately 1 minute.  Unable to  with toco at this time.  Patient reports back labor.    Plan  -Administer vistaril, give bath for comfort and recheck SVE.  -Nursing education on plan of care provided.      Cindy Hyman RN

## 2023-04-14 NOTE — H&P
[facility] Labor and Delivery History and Physical    Eli Al MRN# [id number]   Age: 37 year old YOB: 1985     Date of Admission:  2023    Primary care provider: Forrest Brush           Chief Complaint:   Eli Al is a 37 year old female who is 40w0d pregnant and being admitted for active labor management. Prenatal record/H and P reviewed with patient and unchanged.          Pregnancy history:     OBSTETRIC HISTORY:    OB History    Para Term  AB Living   1 0 0 0 0 0   SAB IAB Ectopic Multiple Live Births   0 0 0 0 0      # Outcome Date GA Lbr Constantine/2nd Weight Sex Delivery Anes PTL Lv   1 Current                EDC: Estimated Date of Delivery: 2023    Prenatal Labs:   Lab Results   Component Value Date    AS Negative 2023    HEPBANG Nonreactive 2022    HGB 13.7 2023       GBS Status:   No results found for: GBS    Active Problem List  Patient Active Problem List   Diagnosis     MRSA (methicillin resistant staph aureus) culture positive     Cellulitis and abscess     Multigravida of advanced maternal age in second trimester     Encounter for sterilization     Encounter for triage in pregnant patient     Threatened labor at term       Medication Prior to Admission  Medications Prior to Admission   Medication Sig Dispense Refill Last Dose     aspirin 81 MG EC tablet Take 81 mg by mouth daily   2023 at 0800     docusate sodium (COLACE) 100 MG capsule Take 1 capsule (100 mg) by mouth 2 times daily as needed for constipation 90 capsule 1 2023 at 0800     doxylamine (UNISOM) 25 MG TABS tablet Take 25 mg by mouth At Bedtime   2023 at 2100     famotidine (PEPCID) 10 MG tablet Take 1 tablet (10 mg) by mouth 2 times daily 120 tablet 1 2023 at 0800     pramox-pe-glycerin-petrolatum (PREPARATION H) 1-0.25-14.4-15 % CREA cream Place rectally 3 times daily   Past Week     Prenatal Vit-Fe Fumarate-FA  "(PRENATAL MULTIVITAMIN W/IRON) 27-0.8 MG tablet Take 1 tablet by mouth daily   2023 at 0800   .        Maternal Past Medical History:   No past medical history on file.                    Family History:   Unchanged from prenatal record            Social History:   Unchanged from prenatal record         Review of Systems:   Per HPI.  Other systems reviewed and negative         Physical Exam:     Vitals:    23 2349   BP: 128/89   BP Location: Right arm   Patient Position: Semi-Boland's   Cuff Size: Adult Regular   Resp: 20   Temp: 97.5  F (36.4  C)   TempSrc: Oral   SpO2: 97%   Weight: 86.2 kg (190 lb)   Height: 1.6 m (5' 3\")     Chest: CTA  CV:  RRR without murmers  General:  Alert and oriented  Abdomen soft, non-tender, gravid  Ext: neg  Cervix:   Membranes: intact   Dilation: 7-8   Effacement: 100%   Station:0   Consistency: soft   Position: Anterior  Presentation:Cephalic  Fetal Heart Rate Tracing: reactive and reassuring  Tocometer: external monitor and frequency q 3 minutes                       Assessment:   Eli Al is a 40w0d pregnant female admitted with active labor management.          Plan:   Admit, Routine intrapartum care and monitoring per protocol.  Desires epidural, anesthesia notified.  Expectant management for .    Fermin Martinez MD      "

## 2023-04-14 NOTE — PROGRESS NOTES
(S) Comfortable with epidural    (O)  Vitals:    04/14/23 0550 04/14/23 0555 04/14/23 0600 04/14/23 0736   BP:  115/75  129/73   Pulse:    72   Resp:    20   Temp:    98.3  F (36.8  C)   TempSrc:    Oral   SpO2: 93% 93% 93% 96%   Weight:       Height:         Cervix:   Membranes: meconium stained fluid   Dilation: 10   Effacement: 100%   Station:+2    Fetal Heart Rate Tracing: reactive and reassuring  Cat:1          Tocometer: external monitor and inadequate    (A/P)  Doing well, will start pushing  MSAF, peds notified  Inadequate contractions, Pitocin augmentation discussed with pt who desires to proceed.

## 2023-04-14 NOTE — TELEPHONE ENCOUNTER
Pt calling with her  Vidal who reports pt having contractions less than eight minutes apart and about 2-3 minutes long. Contractions started at 4 am, progressively getting more intense and regular per Vidal, very intense at time of call. Pt is 39w6d pregnant and first pregnancy. JEREMY is 23. Pt reports she feels very weak and shaky, unable to walk on her own, has not passed out.    OB Triage Call      Is patient's OB/Midwife with the [facility] or [facility]? [facility]- Proceed with triage     Reason for call: Contractions    Assessment: Laboring with possible complications    Plan: Call 911 for emergency evaluation    Patient plans to deliver at [facility]    Patient's primary OB Provider is Juan/Hardeep     Per protocol recommendations Patient to be evaluated in L&D. Patient's primary OB is [facility].  Labor and delivery at [place] ([phone]) notified of patient's pending arrival.  Report given to Rosina WALLACE .    Pt and  refuse to call for paramedic assessment. Vidal states they will drive to ER now. Agree to call 911 if pt worsening on the way.    On call provider Dr. Fermin Martinez    Is patient's delivering hospital on divert? No      39w6d    Estimated Date of Delivery: 2023        OB History    Para Term  AB Living   1 0 0 0 0 0   SAB IAB Ectopic Multiple Live Births   0 0 0 0 0      # Outcome Date GA Lbr Constantine/2nd Weight Sex Delivery Anes PTL Lv   1 Current                No results found for: GBS       Veronica Blue RN 23 10:35 PM  [facility] Nurse Advisor    Reason for Disposition    Shock suspected (e.g., cold/pale/clammy skin, too weak to stand, low BP, rapid pulse)    Additional Information    Negative: Passed out (i.e., lost consciousness, collapsed and was not responding)    Protocols used: PREGNANCY - LABOR-A-AH

## 2023-04-14 NOTE — ANESTHESIA PREPROCEDURE EVALUATION
Anesthesia Pre-Procedure Evaluation    Patient: Eli Al   MRN: [id number] : 1985        Procedure :           No past medical history on file.   No past surgical history on file.   Allergies   Allergen Reactions     Morphine      Became confused and non responsive      Social History     Tobacco Use     Smoking status: Every Day     Packs/day: 0.25     Types: Cigarettes     Smokeless tobacco: Never     Tobacco comments:     smokes and vapes 21   Vaping Use     Vaping status: Not on file   Substance Use Topics     Alcohol use: Not on file      Wt Readings from Last 1 Encounters:   23 86.2 kg (190 lb)        Anesthesia Evaluation   Pt has not had prior anesthetic         ROS/MED HX  ENT/Pulmonary: Comment: smoker      Neurologic:       Cardiovascular:       METS/Exercise Tolerance:     Hematologic:       Musculoskeletal:       GI/Hepatic:       Renal/Genitourinary:       Endo: Comment: MRSA    (+) Obesity, gestational diabetes,    Psychiatric/Substance Use:       Infectious Disease:       Malignancy:       Other:            Physical Exam    Airway        Mallampati: II   TM distance: > 3 FB   Neck ROM: full   Mouth opening: > 3 cm    Respiratory Devices and Support         Dental  no notable dental history         Cardiovascular   cardiovascular exam normal          Pulmonary   pulmonary exam normal            Other findings: Platelets >100    OUTSIDE LABS:  CBC:   Lab Results   Component Value Date    WBC 17.1 (H) 2023    WBC 12.5 (H) 2023    HGB 13.7 2023    HGB 12.9 2023    HCT 41.1 2023    HCT 38.0 2023     2023     2023     BMP:   Lab Results   Component Value Date     (L) 2022    POTASSIUM 4.2 2022    CHLORIDE 103 2022    CO2 22 2022    BUN 10.3 2022    CR 0.61 2022    GLC 80 2022     COAGS: No results found for: PTT, INR, FIBR  POC: No results found for: BGM, HCG,  HCGS  HEPATIC:   Lab Results   Component Value Date    ALBUMIN 3.6 11/29/2022    PROTTOTAL 6.8 11/29/2022    ALT 15 11/29/2022    AST 22 11/29/2022    ALKPHOS 96 11/29/2022    BILITOTAL <0.2 11/29/2022     OTHER:   Lab Results   Component Value Date    RIANNA 9.3 11/29/2022    TSH 3.28 11/29/2022    T4 0.91 11/29/2022       Anesthesia Plan    ASA Status:  2      Anesthesia Type: Epidural.              Consents    Anesthesia Plan(s) and associated risks, benefits, and realistic alternatives discussed. Questions answered and patient/representative(s) expressed understanding.    - Discussed:     - Discussed with:  Patient         Postoperative Care            Comments:    Other Comments: Discussed risks and benefits with patient including itching, sore back, infection, hematoma, spinal headache, CV complications, inability to place, nerve damage. Pt wishes to proceed.             MAGO Warner CRNA

## 2023-04-14 NOTE — L&D DELIVERY NOTE
Delivery Summary    Eli Al MRN# [id number]   Age: 37 year old YOB: 1985     ASSESSMENT & PLAN: Please see separate Vaginal Delivery note for details of delivery.    Gamaliel Baca MD  Obstetrics and Gynecology         Mariama Al-Eli [[id number]]    Labor Event Times    Latent labor onset date/time: 2023 1600    Dilation complete date: 23 Complete time:  6:30 AM   Start pushing date/time: 2023 0808      Labor Length    2nd Stage (hrs): 2 (min): 27   3rd Stage (hrs): 0 (min): 4      Labor Events     labor?: No   steroids: None  Labor Type: AROM  Predominate monitoring during 1st stage: intermittent auscultation, continuous electronic fetal monitoring     Antibiotics received during labor?: No     Rupture identifier: Sac 1  Rupture date/time: 23 0335   Rupture type: Artificial Rupture of Membranes  Fluid color: Meconium  Fluid odor: Normal     Augmentation: None     Delivery/Placenta Date and Time    Delivery Date: 23 Delivery Time:  8:57 AM   Placenta Date/Time: 2023  9:01 AM  Delivering clinician: Gamaliel Baca MD          Vaginal Counts     Initial count performed by 2 team members:  Two Team Members   Lulu ROBLEDO RN       Needles Suture Needles Sponges (RETIRED) Instruments   Initial counts 2  15    Added to count  2     Relief counts       Final counts 2 2 15          Placed during labor Accounted for at the end of labor   FSE No    IUPC No    Cervidil No               Final count performed by 2 team members:  Two Team Members   RODRIGO Hernandez Dr.      Final count correct?: Yes     Apgars    Living status: Living   1 Minute 5 Minute 10 Minute 15 Minute 20 Minute   Skin color: 1  1       Heart rate: 2  2       Reflex irritability: 2  2       Muscle tone: 2  2       Respiratory effort: 2  2       Total: 9  9       Apgars assigned by: TALIB SWEET RN     Cord    Vessels: 3 Vessels    Cord  "Complications: None   Cord Blood Disposition: Lab      Gases Sent?: No      Delayed cord clamping?: Yes      Cord Clamping Delay (seconds): 31-60 seconds      Stem cell collection?: No                      Resuscitation    Methods: None   Care at Delivery: Viable baby girl both via  with Dr. Baca. Bulb suction immediately at perineum by MD. Cord clamped and cut by MD due to being short. Lusty cry noted. Babe placed skin to skin with mom. Dried and stimulated. WELLS freely. Hat placed.      [place] Measurements    Weight: 6 lb 10 oz Length: 1' 7.5\"   Head circumference: 31.8 cm Chest circumference: 31.8 cm   Abdominal girth: 29.2 cm     Skin to Skin and Feeding Plan    Skin to skin initiation date/time: 1841    Skin to skin with: Mother  Skin to skin end date/time:        Labor Events and Shoulder Dystocia    Fetal Tracing Prior to Delivery: Category 1  Shoulder dystocia present?: Neg     Delivery (Maternal) (Provider to Complete) ([id number])    Episiotomy: Median  Reason for episiotomy: impending laceration    Perineal lacerations: None    Repair suture: 2-0 Vicryl  Number of repair packets: 2  Genital tract inspection done: Pos     Blood Loss  Mother: Eli Al #[id number]   Start of Mother's Information    Delivery Blood Loss  237 - 23 1301    Delivery QBL (mL) Hospital Encounter 606.5 mL    Total  606.5 mL         End of Mother's Information  Mother: Eli Al #[id number]          Delivery - Provider to Complete ([id number])    Delivering clinician: Gamaliel Baca MD  Delivery Type (Choose the 1 that will go to the Birth History): Vaginal, Spontaneous                                 Placenta    Date/Time: 2023  9:01 AM  Removal: Spontaneous  Disposition: Hospital disposal           Anesthesia    Method: Epidural  Cervical dilation at placement: 8-10                Presentation and Position    Presentation: Vertex     Occiput Anterior             "     Gamaliel Baca MD

## 2023-04-14 NOTE — L&D DELIVERY NOTE
Name: Eli Al  Age: 37 year old  YOB: 1985   Medical Record #: [id number]     [facility] Vaginal Delivery Note    Date: 2023   Pre-delivery Diagnosis: 1. Intrauterine pregnancy at 40w0d.    2. Advanced maternal age  3. A1GDM  4. Remote history of MRSA infection   Post-delivery Diagnosis: 1. Same.    2. Delivered a viable female infant.   Induction of Labor: No.   Delivery Method/ Type: Spontaneous Vaginal Delivery   Provider: Gamaliel Baca MD   Anesthesia: Epidural anesthesia   Qualitative Blood Loss: 606 ml QBL.   Laceration/ Episiotomy: Midline second degree perineal episiotomy.   Complications: None.   Specimens: Cord blood sent to lab.   Findings: Viable  female infant in occiput anterior position. Apgars were 9 / 9. Weight was 3005 grams. Amniotic fluid was clear. There was good cry at cord clamp. Placenta delivered intact with a three-vessel cord.   Disposition: Patient in stable condition, stayed in delivery room. Infant in stable condition to the nursery.   Times: Delivery of Infant: 857    Delivery of Placenta: 09     Description of Procedure:  Eli Al is a 37 year old  with Estimated Date of Delivery: Data Unavailable who was admitted at 40w0d. The labor was spontaneous.  She was dilated to 7 cm upon admission.  She was progressed normally.  Rupture of membranes was spontaneous. The fetal heart rate tracing was category number 1, and was reassuring during the intrapartum episode. In active labor, her contractions were regular and firm to palpation. Relaxation of the uterus was noted between contractions. She achieved complete cervical dilatation.  She entered a normal second stage labor pattern. She pushed effectively. A midline second degree episiotomy was performed to prevent imminent excessive perineal laceration. She delivered a vigorous live born infant by normal spontaneous vaginal delivery without excessive traction  nor intrapartum complications. The delivery was complicated by none. The infant delivered in occiput anterior position. The nose and mouth were suctioned. The infant's anterior shoulder delivered, followed by delivery of posterior shoulder and then delivery of the rest of the infant in the usual fashion. No resistance was noted at delivery of the shoulders. The baby was active and initiated a good cry. The umbilical cord was clamped and cut after 60 seconds. The baby was placed on the mother's abdomen. Cord blood was obtained for evaluation. The placenta delivered spontaneously and it was inspected and found to be intact. It contained a three-vessel cord. Clots were evacuated from the uterus and vagina. The uterus was firm immediately upon delivery of the placenta. Bimanual massage was performed and Pitocin was given with delivery of the placenta. Bleeding post-delivery was appropriate. The fundus was firm to palpation. The cervix, vagina and perineum were examined with finding of second degree perineal episiotomy. A second degree midline perineal laceration was repaired with 2-0 and 3-0 Vicryl suture in the usual fashion with good hemostasis noted at the conclusion of the procedure. There was good hemostasis noted at the conclusion of the procedure. Rectal exam confirmed that the rectal sphincter and mucosa were intact. The patient was cleaned. Sponge and needle counts were correct. At the conclusion of delivery and repair, Mother and baby were doing well and stable in the postpartum unit. The baby stayed with mother.  The mother stayed in delivery room.    Gamaliel Baca MD  Obstetrics and Gynecology

## 2023-04-14 NOTE — PROGRESS NOTES
Fetal Non-Stress Test Results    NST Ordered By: Dr. Martinez  NST Medical Indication: 6/8 BPP    NST Start & Stop Times  NST Start Time: 1200  NST Stop Time: 1230                            NST Results  Fetus A   Baseline Rate: Normal  Accelerations: Present  Decelerations: None  Interpretation: reactive

## 2023-04-14 NOTE — ANESTHESIA PROCEDURE NOTES
"Epidural catheter Procedure Note    Pre-Procedure   Staff -        CRNA: Tang Thompson APRN CRNA       Performed By: CRNA       Location: OB       Procedure Start/Stop Times: 4/14/2023 3:15 AM and 4/14/2023 3:25 AM       Pre-Anesthestic Checklist: patient identified, IV checked, risks and benefits discussed, informed consent, monitors and equipment checked, pre-op evaluation and at physician/surgeon's request  Timeout:       Correct Patient: Yes        Correct Procedure: Yes        Correct Site: Yes        Correct Position: Yes   Procedure Documentation  Procedure: epidural catheter       Diagnosis: labor pain       Patient Position: RLD and sitting       Patient Prep/Sterile Barriers: sterile gloves, mask, patient draped       Skin prep: Betadine       Local skin infiltrated with 3 mL of 1% lidocaine.        Insertion Site: L3-4. (midline approach).       Technique: LORT saline        BOB at 8 cm.       Needle Type: ToBoujuy needle       Needle Gauge: 18.        Needle Length (Inches): 3.5           Catheter threaded easily.         5 cm epidural space.         Threaded 13 cm at skin.         # of attempts: 1 and  # of redirects:  2    Assessment/Narrative         Paresthesias: Resolved.       Test dose of 3 mL lidocaine 1.5% w/ 1:200,000 epinephrine at 03:19 CDT.         Test dose negative, 3 minutes after injection, for signs of intravascular, subdural, or intrathecal injection.       Insertion/Infusion Method: LORT saline       Aspiration negative for Heme or CSF via Epidural Catheter.    Medication(s) Administered   Medication Administration Time: 4/14/2023 3:15 AM      FOR [facility] ([facility]) ONLY:   Pain Team Contact information: please page the Pain Team [address] [employer]. Search \"Pain\". During daytime hours, please page the attending first. At night please page the resident first.      "

## 2023-04-14 NOTE — PLAN OF CARE
Assessments completed as charted. B/P: 134/80, T: 98.8, P: 72, R: 20. Rates pain: 0/10 .     Lungs: clear. : due to void following delivery. Fundus: Midline firm and 1 below umbilicus. Lochia: Light. Activity: still in bed due to residual numbness from epidural. Infant feeding: Breast feeding going well.       Postpartum breastfeeding assessment completed and education provided, see Patient Education Activity.  Items included in the education are:   proper positioning and latch  effectiveness of feeding  manual expression  handling and storing breastmilk  maintenance of breastfeeding for the first 6 months  sign/symptoms of infant feeding issues requiring referral to qualified health care provider  Postpartum care education provided, see Patient Education activity. Patient denies needs. Will monitor.    Blanca Stephens RN

## 2023-04-15 LAB
ANION GAP SERPL CALCULATED.3IONS-SCNC: 9 MMOL/L (ref 7–15)
BUN SERPL-MCNC: 8.7 MG/DL (ref 6–20)
CALCIUM SERPL-MCNC: 8.7 MG/DL (ref 8.6–10)
CHLORIDE SERPL-SCNC: 108 MMOL/L (ref 98–107)
CREAT SERPL-MCNC: 0.84 MG/DL (ref 0.51–0.95)
DEPRECATED HCO3 PLAS-SCNC: 20 MMOL/L (ref 22–29)
ERYTHROCYTE [DISTWIDTH] IN BLOOD BY AUTOMATED COUNT: 14 % (ref 10–15)
GFR SERPL CREATININE-BSD FRML MDRD: 90 ML/MIN/1.73M2
GLUCOSE SERPL-MCNC: 107 MG/DL (ref 70–99)
HCT VFR BLD AUTO: 30.9 % (ref 35–47)
HGB BLD-MCNC: 10.4 G/DL (ref 11.7–15.7)
MCH RBC QN AUTO: 28.6 PG (ref 26.5–33)
MCHC RBC AUTO-ENTMCNC: 33.7 G/DL (ref 31.5–36.5)
MCV RBC AUTO: 85 FL (ref 78–100)
PLATELET # BLD AUTO: 277 10E3/UL (ref 150–450)
POTASSIUM SERPL-SCNC: 4 MMOL/L (ref 3.4–5.3)
RBC # BLD AUTO: 3.64 10E6/UL (ref 3.8–5.2)
SODIUM SERPL-SCNC: 137 MMOL/L (ref 136–145)
T PALLIDUM AB SER QL: NONREACTIVE
WBC # BLD AUTO: 17.2 10E3/UL (ref 4–11)

## 2023-04-15 PROCEDURE — 85027 COMPLETE CBC AUTOMATED: CPT | Performed by: OBSTETRICS & GYNECOLOGY

## 2023-04-15 PROCEDURE — 36415 COLL VENOUS BLD VENIPUNCTURE: CPT | Performed by: OBSTETRICS & GYNECOLOGY

## 2023-04-15 PROCEDURE — 120N000001 HC R&B MED SURG/OB

## 2023-04-15 PROCEDURE — 80048 BASIC METABOLIC PNL TOTAL CA: CPT | Performed by: OBSTETRICS & GYNECOLOGY

## 2023-04-15 PROCEDURE — 250N000013 HC RX MED GY IP 250 OP 250 PS 637: Performed by: OBSTETRICS & GYNECOLOGY

## 2023-04-15 RX ADMIN — IBUPROFEN 800 MG: 800 TABLET, FILM COATED ORAL at 07:46

## 2023-04-15 RX ADMIN — ACETAMINOPHEN 650 MG: 325 TABLET ORAL at 11:05

## 2023-04-15 RX ADMIN — FAMOTIDINE 10 MG: 10 TABLET, FILM COATED ORAL at 19:58

## 2023-04-15 RX ADMIN — IBUPROFEN 800 MG: 800 TABLET, FILM COATED ORAL at 00:18

## 2023-04-15 RX ADMIN — IBUPROFEN 800 MG: 800 TABLET, FILM COATED ORAL at 19:58

## 2023-04-15 RX ADMIN — DOCUSATE SODIUM 100 MG: 100 CAPSULE, LIQUID FILLED ORAL at 08:47

## 2023-04-15 RX ADMIN — FAMOTIDINE 10 MG: 10 TABLET, FILM COATED ORAL at 08:47

## 2023-04-15 RX ADMIN — IBUPROFEN 800 MG: 800 TABLET, FILM COATED ORAL at 14:07

## 2023-04-15 ASSESSMENT — ACTIVITIES OF DAILY LIVING (ADL)
ADLS_ACUITY_SCORE: 18

## 2023-04-15 NOTE — CARE PLAN
Glucose value at 107. Dr. Baca aware of the value indicates no further glucose monitoring needed for this Pt.

## 2023-04-15 NOTE — CARE PLAN
Face to face report given with opportunity to observe patient.    Report given to RODRIGO Hoyt.    Blanca Stephens RN   4/14/2023  7:17 PM

## 2023-04-15 NOTE — PLAN OF CARE
Assessments completed as charted. B/P: 134/80, T: 97.8, P: 87, R: 16. Rates pain: 3/10. Voiding without difficulty. Fundus: Midline -1/U. Lochia: Light. Activity: unrestricted with out pain  and normal activity. Infant feeding: Breast feeding going well.           Postpartum breastfeeding assessment completed and education provided, see Patient Education Activity.  Items included in the education are:     proper positioning and latch    effectiveness of feeding    manual expression    handling and storing breastmilk    maintenance of breastfeeding for the first 6 months    sign/symptoms of infant feeding issues requiring referral to qualified health care provider  Postpartum care education provided, see Patient Education activity. Patient denies needs. Will monitor.  Angella Amos RN

## 2023-04-15 NOTE — PLAN OF CARE
Goal Outcome Evaluation:      Plan of Care Reviewed With: patient    Overall Patient Progress: improvingOverall Patient Progress: improving  Assessments completed as charted. B/P: 133/73, T: 98.1, P: 87, R: 16. Rates pain: 1/10 episiotomy site and hemrhoids. Voiding without difficulty. Fundus: Midline Firm 1 below Umbilicus. Lochia: Light. Activity: unrestricted with out pain . Infant feeding: Breast feeding going well.           Postpartum breastfeeding assessment completed and education provided, see Patient Education Activity.  Items included in the education are:   proper positioning and latch  effectiveness of feeding  manual expression  handling and storing breastmilk  maintenance of breastfeeding for the first 6 months  sign/symptoms of infant feeding issues requiring referral to qualified health care provider  Postpartum care education provided, see Patient Education activity. Patient denies needs. Will monitor.  MAURILIO MURCIA RN

## 2023-04-15 NOTE — PLAN OF CARE
Goal Outcome Evaluation:                      Assessments completed as charted. B/P: 128/85, T: 98, P: 93, R: 16. Rates pain: 2/10 at episiotomy site. Using tucks, dermaplast, and intermittent use of ice packs.. Voiding without difficulty. Fundus: Midline U-1. Lochia: Light. Activity: unrestricted with out pain . Infant feeding: Breast feeding going with mild difficulty. Baby wanting to feed frequently and hard to console.  Educated on frequency and length of feeding. Latch assessed.   Parents attentive and eager to learn.         Postpartum breastfeeding assessment completed and education provided, see Patient Education Activity.  Items included in the education are:   proper positioning and latch  effectiveness of feeding  manual expression  handling and storing breastmilk  maintenance of breastfeeding for the first 6 months  sign/symptoms of infant feeding issues requiring referral to qualified health care provider  Postpartum care education provided, see Patient Education activity. Patient denies needs. Will monitor.  Lai Erickson RN

## 2023-04-15 NOTE — PROGRESS NOTES
"Name: Eli Al  Age: 37 year old  YOB: 1985   Medical Record #: [id number]     Postpartum Day 1: Vaginal delivery    DATE: 4/15/2023  SUBJECTIVE:  Patient is doing well. Her pain is well controlled with current medications. She reports normal lochia. She is tolerating a regular diet without nausea. She is ambulating and voiding. She is passing flatus. The patient denies depression symptoms. She denies headache or vision changes, shortness of breath, or chest pain. She denies fever or chills. The baby is well. She is nursing.    OBJECTIVE:  /80 (BP Location: Left arm, Patient Position: Semi-Boland's, Cuff Size: Adult Regular)   Pulse 87   Temp 97.8  F (36.6  C) (Oral)   Resp 16   Ht 1.6 m (5' 3\")   Wt 86.2 kg (190 lb)   LMP 07/08/2022 (Exact Date)   SpO2 95%   Breastfeeding Unknown   BMI 33.66 kg/m      Well developed and well nourished female in no apparent distress. Alert and oriented. Lungs are clear to auscultation bilaterally. Heart is regular rate and rhythm. Abdomen is nondistended with positive bowel sounds. Uterine fundus is firm and palpated below the umbilicus. Perineum is clean, dry and intact. Extremities no edema, non-tender.    LABS :  OB Labs  Type and Rh: O Positive  Antibody screen: Negative  Rubella: Positive  Treponema: Negative  Hepatitis B surface antigen: Negative  Hepatitis C: Negative  HIV: Negative  Chlamydia / Gonorrhea: Negative  Diabetic Screen: 150  3 Hour GTT: 79, 213, 169, 95  GBS culture: Negative    11/01/2022 MRSA Cx: Positive  04/07/2023 MRSA Cx: Negative  04/14/2023 MRSA Cx: pending  04/14/2023 Treponema: pending    CBC Results (Last 2)  Recent Labs   Lab Test 04/15/23  0601 04/14/23  0230   WBC 17.2* 17.1*   RBC 3.64* 4.82   HGB 10.4* 13.7   HCT 30.9* 41.1   MCV 85 85    302     BMP Results  Recent Labs   Lab Test 04/15/23  0601      POTASSIUM 4.0   CHLORIDE 108*   CO2 20*   BUN 8.7   CR 0.84   *   RIANNA 8.7 "     IMPRESSION :  1. 37 year old  female at 40w0d, now delivered.  2. PPD# 1 s/p Spontaneous vaginal delivery, doing well.  3. Nursing  4. A1GDM  5. History of MRSA infection    PLAN:  1. Continue routine post-partum care.  2. I recommend that the patient continue low carb diet and have a 2 hour postpartum glucose test at the 6 week postpartum visit.  3. The patient had remote history of MRSA in  but never cleared. She then had a positive Cx in November which showed she was a chronic carrier. Infectious disease consult for MRSA for clearance, and they recommended chlorhex baths x3 and then repeat culture x 2 in April. First culture negative and second pending.  4. Repeat MRSA culture obtained per Infectious disease. Continue Contact isolation.  5. Encourage nursing.  6. Reviewed some of the home instructions in anticipation of discharge; these include being aware of warning signs of postpartum depression, symptoms of infection, avoiding constipation, iron replacement, and the need to seek medical evaluation for any questions or concerns.  7. Options for birth control have been reviewed and a final decision and prescription for that will occur in the postpartum clinic visit. The patient desires vasectomy.  8. Repeat Serology / Treponema Pallidum Antibody Test obtained per [facility] protocol.  9. Anticipate discharge on PPD# 2 in stable condition.     Gamaliel Baca MD  Obstetrics and Gynecology

## 2023-04-16 VITALS
DIASTOLIC BLOOD PRESSURE: 87 MMHG | HEIGHT: 63 IN | BODY MASS INDEX: 33.66 KG/M2 | OXYGEN SATURATION: 95 % | SYSTOLIC BLOOD PRESSURE: 140 MMHG | TEMPERATURE: 98.1 F | WEIGHT: 190 LBS | HEART RATE: 74 BPM | RESPIRATION RATE: 16 BRPM

## 2023-04-16 LAB — BACTERIA SPEC CULT: NORMAL

## 2023-04-16 PROCEDURE — 250N000013 HC RX MED GY IP 250 OP 250 PS 637: Performed by: OBSTETRICS & GYNECOLOGY

## 2023-04-16 RX ORDER — IBUPROFEN 800 MG/1
800 TABLET, FILM COATED ORAL EVERY 6 HOURS PRN
Qty: 100 TABLET | Refills: 0 | Status: SHIPPED | OUTPATIENT
Start: 2023-04-16 | End: 2023-05-04

## 2023-04-16 RX ORDER — ACETAMINOPHEN 325 MG/1
650 TABLET ORAL EVERY 4 HOURS PRN
Qty: 100 TABLET | Refills: 0 | Status: SHIPPED | OUTPATIENT
Start: 2023-04-16 | End: 2023-05-04

## 2023-04-16 RX ADMIN — DOCUSATE SODIUM 100 MG: 100 CAPSULE, LIQUID FILLED ORAL at 08:04

## 2023-04-16 RX ADMIN — IBUPROFEN 800 MG: 800 TABLET, FILM COATED ORAL at 08:04

## 2023-04-16 RX ADMIN — FAMOTIDINE 10 MG: 10 TABLET, FILM COATED ORAL at 08:03

## 2023-04-16 RX ADMIN — ACETAMINOPHEN 650 MG: 325 TABLET ORAL at 08:04

## 2023-04-16 ASSESSMENT — ACTIVITIES OF DAILY LIVING (ADL)
ADLS_ACUITY_SCORE: 18

## 2023-04-16 NOTE — CARE PLAN
Patient discharged at 12:15 PM via ambulation accompanied by spouse and staff. Prescriptions sent to patients preferred pharmacy. All belongings sent with patient.     Discharge instructions reviewed with patient. Listed belongings gathered and returned to patient; room clear of belongings.    Patient discharged home.     Surgical Procedures during stay: Episiotomy  Did patient receive discharge instruction on wound care and recognition of infection symptoms? Yes    MISC  Follow up appointment made:  Yes  Patient reports pain was well managed at discharge: Yes     Patient verbalized understanding of all discharge instructions & follow up care.  AVS given.  Postpartum Info Book given as well to reference discharge instructions as needed.  MAURILIO MURCIA RN

## 2023-04-16 NOTE — DISCHARGE INSTRUCTIONS
Post-Partum Discharge Instructions Vaginal delivery    Discharge Diet: Regular diet   Discharge Activity: Increase activity as tolerated. No vigorous activity or exercise for 2 weeks.    Pelvic rest for 6 weeks which includes intercourse, douching and tampons.   Discharge Nursing: Nursing is encouraged.    Schedule outpatient lactation follow up in 2-4 days.   Discharge Instructions: Seek immediate medical evaluation and assistance if you develop any warning signs of postpartum depression.    Avoid constipation and straining. Use stool softeners and fiber to avoid constipation.    Call the OB/GYN Clinic Nurse at [phone] for problems such as fever (temperature >100.4), chills, pain not controlled by usual oral pain medications, persistent nausea and vomiting, constipation, difficulty nursing, heavy odorous vaginal discharge, burning or pain associated with urination.  Call for excessive vaginal bleeding, saturating more than one pad an hour for more than three consecutive hours.  Call for any problems with the incision, drainage or redness from incision site or increasing pain.   Discharge Follow-up: Follow up for outpatient lactation appointment in 2-4 days if desired.    Gestational diabetic patients to follow up for fasting blood sugar and 2 hour 75gm glucose load at 6 weeks postpartum.    Follow up for postpartum exam in 2 weeks with Dr. Martinez or Candi Meier NP.    Follow up for postpartum exam in 6 weeks with Dr. Martinez.    Follow up with Primary Care Provider as scheduled for management of active medical problems and for adult preventive services.    Call Dr. Baca at the OB/GYN Clinic at [phone] as necessary if you have problems in the interim.        Warning Signs after Having a Baby    Keep this paper on your fridge or somewhere else where you can see it.    Call your provider if you have any of these symptoms up to 12 weeks after having your baby.    Thoughts of hurting yourself or your baby  Pain  in your chest or trouble breathing  Severe headache not helped by pain medicine  Eyesight concerns (blurry vision, seeing spots or flashes of light, other changes to eyesight)  Fainting, shaking or other signs of a seizure    Call 9-1-1 if you feel that it is an emergency.     The symptoms below can happen to anyone after giving birth. They can be very serious. Call your provider if you have any of these warning signs.    My provider s phone number: _______________________    Losing too much blood (hemorrhage)    Call your provider if you soak through a pad in less than an hour or pass blood clots bigger than a golf ball. These may be signs that you are bleeding too much.    Blood clots in the legs or lungs    After you give birth, your body naturally clots its blood to help prevent blood loss. Sometimes this increased clotting can happen in other areas of the body, like the legs or lungs. This can block your blood flow and be very dangerous.     Call your provider if you:  Have a red, swollen spot on the back of your leg that is warm or painful when you touch it.   Are coughing up blood.     Infection    Call your provider if you have any of these symptoms:  Fever of 100.4 F (38 C) or higher.  Pain or redness around your stitches if you had an incision.   Any yellow, white, or green fluid coming from places where you had stitches or surgery.    Mood Problems (postpartum depression)    Many people feel sad or have mood changes after having a baby. But for some people, these mood swings are worse.     Call your provider right away if you feel so anxious or nervous that you can't care for yourself or your baby.    Preeclampsia (high blood pressure)    Even if you didn't have high blood pressure when you were pregnant, you are at risk for the high blood pressure disease called preeclampsia. This risk can last up to 12 weeks after giving birth.     Call your provider if you have:   Pain on your right side under your rib  cage  Sudden swelling in the hands and face    Remember: You know your body. If something doesn't feel right, get medical help.     For informational purposes only. Not to replace the advice of your health care provider. Copyright 2020 [employer]. All rights reserved. Clinically reviewed by Tatianna Stallings, RNC-OB, MSN. [employer] [id number] - Rev 02/23.

## 2023-04-16 NOTE — DISCHARGE SUMMARY
"OB VAGINAL BIRTH DISCHARGE SUMMARY    Name: Eli Al  Age: 37 year old  YOB: 1985   Medical Record #: [id number]     Date of Admission:  2023  Date of Discharge:  2023  Admitting Physician:  Fermin Martinez MD  Discharge Physician:  Gamaliel Baca MD  Discharging Service:  Obstetrics and Gynecology     Primary Provider:   Forrest Brush          Admission Diagnoses:     Encounter for triage in pregnant patient [Z36.89]  Threatened labor at term [O47.9]          Discharge Diagnosis:     1. 37 year old  female at 40w0d, now delivered.  2. PPD# 2 s/p Spontaneous vaginal delivery, doing well.  3. Nursing  4. A1GDM  5. History of MRSA infection             Discharge Disposition:     Discharged to home.           Condition on Discharge:     Discharge condition: Stable   Discharge vitals: /86   Pulse 74   Temp 98.1  F (36.7  C) (Oral)   Resp 16   Ht 1.6 m (5' 3\")   Wt 86.2 kg (190 lb)   LMP 2022 (Exact Date)   SpO2 95%   Breastfeeding Unknown   BMI 33.66 kg/m     Code status on discharge: Full Code          Brief History of Illness:     Eli Al is a 37 year old female who was admitted for labor. Please see her admit H&P for full details of her PMH, PSH, Meds, Allergies and exam on admission.          Procedure Performed:     Normal spontaneous vaginal delivery  Epidural analgesia         Infant Delivery Information:     40w0d at time of delivery.  Delivery Date: 2023  Sex: Female  Weight: 3005 gm  APGAR 1 min: 9  APGAR 5 min: 9         Hospital Course:     Eli Al is a 37 year old female who was admitted to the hospital for the above listed indications. Her labor progressed. She had SROM. She had a spontaneous vaginal delivery. Please see her Delivery Summary for full details regarding her delivery. She had an uncomplicated postpartum course. She had gestational diabetes diet controlled during pregnancy and her blood " sugars were good postpartum. I recommend gestational diabetic patients to follow up for fasting blood sugar and 2 hour 75gm glucose load at 6 weeks postpartum visit. The patient had remote history of MRSA in 2012 but was never cleared. She then had a positive culture in November which showed she was a chronic carrier. Infectious disease consult for MRSA for clearance, and they recommended chlorhex baths x3 and then repeat culture x 2 in April. First culture negative and second pending during this hospitalization. Patient had contact isolation. Breastfeeding well. Infant is stable. She will be discharged home in good condition on PPD# 2. Her hemoglobin is 10.4. Her Blood type and Rh status is O Positive, and Rhogam was not indicated.          Post-Partum Complications:     None.         Contraception:     The patient desires vasectomy with abstinence until completion for contraception. To be discussed at Postpartum visit.         Medications Prior to Admission:     Medications Prior to Admission   Medication Sig Dispense Refill Last Dose     docusate sodium (COLACE) 100 MG capsule Take 1 capsule (100 mg) by mouth 2 times daily as needed for constipation 90 capsule 1 4/13/2023 at 0800     famotidine (PEPCID) 10 MG tablet Take 1 tablet (10 mg) by mouth 2 times daily 120 tablet 1 4/13/2023 at 0800     pramox-pe-glycerin-petrolatum (PREPARATION H) 1-0.25-14.4-15 % CREA cream Place rectally 3 times daily   Past Week     Prenatal Vit-Fe Fumarate-FA (PRENATAL MULTIVITAMIN W/IRON) 27-0.8 MG tablet Take 1 tablet by mouth daily   4/13/2023 at 0800     [DISCONTINUED] aspirin 81 MG EC tablet Take 81 mg by mouth daily   4/13/2023 at 0800     [DISCONTINUED] doxylamine (UNISOM) 25 MG TABS tablet Take 25 mg by mouth At Bedtime   4/13/2023 at 2100             Discharge Medications:     Current Discharge Medication List      START taking these medications    Details   acetaminophen (TYLENOL) 325 MG tablet Take 2 tablets (650 mg) by  mouth every 4 hours as needed for mild pain, fever or headaches  Qty: 100 tablet, Refills: 0    Associated Diagnoses: Multigravida of advanced maternal age in second trimester      ibuprofen (ADVIL/MOTRIN) 800 MG tablet Take 1 tablet (800 mg) by mouth every 6 hours as needed for other or moderate pain (cramping)  Qty: 100 tablet, Refills: 0    Associated Diagnoses: Multigravida of advanced maternal age in second trimester         CONTINUE these medications which have NOT CHANGED    Details   docusate sodium (COLACE) 100 MG capsule Take 1 capsule (100 mg) by mouth 2 times daily as needed for constipation  Qty: 90 capsule, Refills: 1    Associated Diagnoses: Constipation during pregnancy in second trimester      famotidine (PEPCID) 10 MG tablet Take 1 tablet (10 mg) by mouth 2 times daily  Qty: 120 tablet, Refills: 1    Associated Diagnoses: Gastroesophageal reflux in pregnancy      pramox-pe-glycerin-petrolatum (PREPARATION H) 1-0.25-14.4-15 % CREA cream Place rectally 3 times daily      Prenatal Vit-Fe Fumarate-FA (PRENATAL MULTIVITAMIN W/IRON) 27-0.8 MG tablet Take 1 tablet by mouth daily         STOP taking these medications       aspirin 81 MG EC tablet Comments:   Reason for Stopping:         doxylamine (UNISOM) 25 MG TABS tablet Comments:   Reason for Stopping:                     Consultations:     No consultations were requested during this admission          Significant Results:     None.             Pending Results:     MRSA culture           Discharge Instructions and Follow-Up:     Discharge Diet: Regular   Discharge Activity: Increase activity as tolerated. No vigorous activity or exercise for 2 weeks.    Pelvic rest for 6 weeks which includes intercourse, douching and tampons.   Discharge Nursing: Nursing is encouraged.    Schedule outpatient lactation follow up in 2-4 days.   Discharge Instructions: The warning signs of postpartum depression have been reviewed, and the patient is aware that this can be  a common occurrence. She is encouraged to seek immediate medical evaluation and assistance for any questions or concerns.    Instructions on avoiding constipation and straining are discussed. The patient is advised in the appropriate use of stool softeners to avoid constipation.    The patient will call the OB/GYN Clinic Nurse at [phone] for problems such as fever (temperature >100.4), chills, pain not controlled by usual oral pain medications, persistent nausea and vomiting, constipation, difficulty nursing, heavy odorous vaginal discharge, burning or pain associated with urination.  Also call for excessive vaginal bleeding, saturating more than one pad an hour for more than three consecutive hours.   Discharge Follow-up: Follow up for outpatient lactation appointment in 2-4 days if desired.    Gestational diabetic patients to follow up for fasting blood sugar and 2 hour 75gm glucose load at 6 weeks postpartum.    Follow up for postpartum exam in 2 weeks with Dr. Martinez or Candi Meier NP.    Follow up for postpartum exam in 6 weeks with Dr. Martinez.    Follow up with Primary Care Provider as scheduled for management of active medical problems and for adult preventive services.    Call Dr. Baca at the OB/GYN Clinic at [phone] as necessary if you have problems in the interim.     Total time spent for discharge on date of discharge: 45 minutes  I saw the patient on the date of discharge.    Gamaliel Baca MD  Obstetrics and Gynecology

## 2023-04-16 NOTE — PLAN OF CARE
Goal Outcome Evaluation:                  Assessments completed as charted. B/P: 134/83, T: 98, P: 87, R: 16. Rates pain: 0/10 . Voiding without difficulty. Fundus: Midline U-1, Firm. Lochia: Light. Activity: unrestricted with out pain . Infant feeding: Breast feeding going well. Plan for discontinue in morning.           Postpartum breastfeeding assessment completed and education provided, see Patient Education Activity.  Items included in the education are:   proper positioning and latch  effectiveness of feeding  manual expression  handling and storing breastmilk  maintenance of breastfeeding for the first 6 months  sign/symptoms of infant feeding issues requiring referral to qualified health care provider  Postpartum care education provided, see Patient Education activity. Patient denies needs. Will monitor.  Lai Erickson RN

## 2023-04-16 NOTE — PLAN OF CARE
Face to face report given with opportunity to observe patient.    Report given to RODRIGO Fontanez RN   4/15/2023  7:16 PM

## 2023-04-16 NOTE — PLAN OF CARE
Goal Outcome Evaluation:      Plan of Care Reviewed With: patient    Overall Patient Progress: improvingOverall Patient Progress: improving    Assessments completed as charted. B/P: 134/86, T: 98.1, P: 74, R: 16. Rates pain: 2/10 from 8/10 with moist heat to engorged breasts. Voiding without difficulty. Fundus: Midline Firm 2 below U. Lochia: Light. Activity: normal activity. Infant feeding: Breast feeding going fair.  Will give mom swedish milk cups for engorgement comfort and flat nipples.           Postpartum breastfeeding assessment completed and education provided, see Patient Education Activity.  Items included in the education are:   proper positioning and latch  effectiveness of feeding  manual expression  handling and storing breastmilk  maintenance of breastfeeding for the first 6 months  sign/symptoms of infant feeding issues requiring referral to qualified health care provider  Postpartum care education provided, see Patient Education activity. Patient denies needs. Will monitor.  MAURILIO MURCIA RN

## 2023-04-16 NOTE — PROGRESS NOTES
"Name: Eli Al  Age: 37 year old  YOB: 1985   Medical Record #: [id number]     Postpartum Day 2: Vaginal delivery    DATE: 2023  SUBJECTIVE:  Patient is doing well. Her pain is well controlled with current medications. She reports normal lochia. She is tolerating a regular diet without nausea. She is ambulating and voiding. The patient denies depression symptoms. She denies headache, vision changes, shortness of breath, or chest pain. She denies fever or chills. The baby is well. She is nursing. She desires discharge.    OBJECTIVE:  /86   Pulse 74   Temp 98.1  F (36.7  C) (Oral)   Resp 16   Ht 1.6 m (5' 3\")   Wt 86.2 kg (190 lb)   LMP 2022 (Exact Date)   SpO2 95%   Breastfeeding Unknown   BMI 33.66 kg/m      Well developed and well nourished female in no apparent distress. Alert and oriented. Lungs are clear to auscultation bilaterally. Heart is regular rate and rhythm. Abdomen is nondistended with positive bowel sounds. Uterine fundus is firm and palpated below the umbilicus. Perineum is clean, dry and intact. Extremities no edema, non-tender.    LABS :  2022 MRSA Cx: Positive  2023 MRSA Cx: Negative  2023 MRSA Cx: pending    IMPRESSION :  1. 37 year old  female at 40w0d, now delivered.  2. PPD# 2 s/p Spontaneous vaginal delivery, doing well.  3. Nursing  4. A1GDM  5. History of MRSA infection    PLAN:  1. Continue routine post-partum care.  2. Encourage nursing.  3. Reviewed home instructions in anticipation of discharge; these include being aware of warning signs of postpartum depression, symptoms of infection, avoiding constipation, iron replacement, and the need to seek medical evaluation for any questions or concerns.  4. Options for birth control have been reviewed and a final decision and prescription for that will occur in the postpartum clinic visit.  5. Repeat Serology / Treponema Pallidum Antibody Test obtained per [place] " Department of Health protocol.  6. The patient had remote history of MRSA in 2012 but never cleared. She then had a positive Cx in November which showed she was a chronic carrier. Infectious disease consult for MRSA for clearance, and they recommended chlorhex baths x3 and then repeat culture x 2 in April. First culture negative and second pending. Continue contact isolation.  7. I recommend that the patient continue low carb diet and have a 2 hour postpartum glucose test at the 6 week postpartum visit.  8. The patient desires vasectomy with abstinence until completion for contraception.  9. Anticipate discharge today in stable condition.     Gamaliel Baca MD  Obstetrics and Gynecology

## 2023-05-04 ENCOUNTER — PRENATAL OFFICE VISIT (OUTPATIENT)
Dept: OBGYN | Facility: OTHER | Age: 38
End: 2023-05-04
Attending: OBSTETRICS & GYNECOLOGY

## 2023-05-04 VITALS
WEIGHT: 175 LBS | SYSTOLIC BLOOD PRESSURE: 110 MMHG | DIASTOLIC BLOOD PRESSURE: 82 MMHG | BODY MASS INDEX: 31.01 KG/M2 | HEIGHT: 63 IN

## 2023-05-04 PROCEDURE — 99207 PR NO CHARGE LOS: CPT | Performed by: OBSTETRICS & GYNECOLOGY

## 2023-05-04 ASSESSMENT — PATIENT HEALTH QUESTIONNAIRE - PHQ9
SUM OF ALL RESPONSES TO PHQ QUESTIONS 1-9: 8
5. POOR APPETITE OR OVEREATING: NOT AT ALL

## 2023-05-04 ASSESSMENT — PAIN SCALES - GENERAL: PAINLEVEL: NO PAIN (0)

## 2023-05-04 ASSESSMENT — ANXIETY QUESTIONNAIRES
6. BECOMING EASILY ANNOYED OR IRRITABLE: MORE THAN HALF THE DAYS
2. NOT BEING ABLE TO STOP OR CONTROL WORRYING: SEVERAL DAYS
GAD7 TOTAL SCORE: 6
5. BEING SO RESTLESS THAT IT IS HARD TO SIT STILL: SEVERAL DAYS
GAD7 TOTAL SCORE: 6
3. WORRYING TOO MUCH ABOUT DIFFERENT THINGS: SEVERAL DAYS
7. FEELING AFRAID AS IF SOMETHING AWFUL MIGHT HAPPEN: NOT AT ALL
1. FEELING NERVOUS, ANXIOUS, OR ON EDGE: SEVERAL DAYS
IF YOU CHECKED OFF ANY PROBLEMS ON THIS QUESTIONNAIRE, HOW DIFFICULT HAVE THESE PROBLEMS MADE IT FOR YOU TO DO YOUR WORK, TAKE CARE OF THINGS AT HOME, OR GET ALONG WITH OTHER PEOPLE: NOT DIFFICULT AT ALL

## 2023-05-04 NOTE — PROGRESS NOTES
"POSTPARTUM VISIT    REASON FOR VISIT / CHEIF COMPLAINT  Early postpartum exam.    HISTORY OF PRESENT ILLNESS  Eli Al is a 37 year old  female who is 3 weeks status post normal spontaneous vaginal delivery on 2023 delivering a healthy baby girl. She presents for early postpartum visit today. She is doing well and has no complaints.  She is nursing. Her infant is doing well.    Delivery complications: No.  Breast feeding: Yes.  Bladder problems: No.  Bowel problems / hemorrhoids: No.  Episiotomy / laceration / incision healed: Yes.  Vaginal lochia: None.  Menses since delivery: No.  Emotional adjustment: doing well and happy.  Depression symptoms or suicide thoughts: No.  Post delivery pain: No.  Contraception plans: vasectomy.  Other concerns: No.    ALLERGIES     Allergies   Allergen Reactions     Morphine      Became confused and non responsive       MEDICATIONS    Current Outpatient Medications:      docusate sodium (COLACE) 100 MG capsule, Take 1 capsule (100 mg) by mouth 2 times daily as needed for constipation, Disp: 90 capsule, Rfl: 1     Prenatal Vit-Fe Fumarate-FA (PRENATAL MULTIVITAMIN W/IRON) 27-0.8 MG tablet, Take 1 tablet by mouth daily, Disp: , Rfl:     REVIEW OF SYSTEMS  As per HPI, otherwise negative.    The patient's Medical Hx, Surgical Hx, Obstetrical Hx, Social Hx, and Family Hx have been reviewed and updated in the electronic medical record.    OBJECTIVE  /82   Ht 1.6 m (5' 3\")   Wt 79.4 kg (175 lb)   LMP 2022 (Exact Date)   Breastfeeding Yes   BMI 31.00 kg/m      General:  Well-developed, well-nourished female in no apparent distress.  Neurological: Alert and oriented x3.  Breast: Deferred.  Abdomen: Soft, nontender, nondistended, positive bowel sounds.  No organomegaly. No rebound, no guarding. Fundus is firm and nontender above pubic symphysis.  Pelvic exam: Deferred.  Extremities:  No clubbing cyanosis or edema. Nontender " bilaterally.    DIAGNOSTICS  2022 MRSA Cx: Positive  2023 MRSA Cx: Negative  2023 MRSA Cx: Negative    PHQ-9 score:       2023    11:34 AM   PHQ   PHQ-9 Total Score 8   Q9: Thoughts of better off dead/self-harm past 2 weeks Not at all       ASSESSMENT / PLAN  Eli Al is a 37 year old  female who is 3 weeks status post normal spontaneous vaginal delivery on 2023.    1 Normal early postpartum exam after vaginal delivery  - The patient is making excellent postpartum progress.  - Routine postpartum precautions, recommendations and restrictions are reviewed with the patient.  - Routine postpartum depression precautions are reviewed with the patient.  - I recommend that the patient refrain from intercourse for 6-8 weeks after delivery.  I recommend the patient be on reliable contraception before resuming intercourse.    2 Contraception counseling  - I discussed contraception options available to the patient including oral contraceptive pills both continuous and cyclic, progestin only oral contraceptive pills, Ortho Evra patch, NuvaRing, Depo-Provera injections, Nexplanon, Mirena IUD, ParaGard IUD, Saira IUD and condoms.  I reviewed the risks, benefits, alternatives, indications and side effects of each of these contraception options with the patient.  - The patient will use vasectomy for birth control.  The patient's  has an appointment in [place].  I recommend abstinence until after the vasectomy  - All questions were answered.  - Contraceptive compliance was emphasized.    3 Nursing  - Nursing is encouraged.  - Follow up with outpatient lactation appointment as needed.    4 A1GDM  - I recommend that the patient continue low carb diet and have a 2 hour postpartum glucose test at the 6 week postpartum visit.    5 History of MRSA infection  - Negative culture x 2.  - Contact infectious disease RN for clearance.    - All of the patient's questions were answered.  -  Problem list reviewed and updated.  - Follow up in 3-4 weeks for postpartum visit including physical examination, pelvic examination and Pap smear as needed.    Gamaliel Baca MD  Obstetrics and Gynecology

## 2023-05-18 ENCOUNTER — MEDICAL CORRESPONDENCE (OUTPATIENT)
Dept: HEALTH INFORMATION MANAGEMENT | Facility: HOSPITAL | Age: 38
End: 2023-05-18

## 2023-06-01 ENCOUNTER — PRENATAL OFFICE VISIT (OUTPATIENT)
Dept: OBGYN | Facility: OTHER | Age: 38
End: 2023-06-01
Attending: OBSTETRICS & GYNECOLOGY

## 2023-06-01 VITALS
BODY MASS INDEX: 31.36 KG/M2 | DIASTOLIC BLOOD PRESSURE: 78 MMHG | WEIGHT: 177 LBS | HEIGHT: 63 IN | SYSTOLIC BLOOD PRESSURE: 118 MMHG

## 2023-06-01 DIAGNOSIS — K64.4 EXTERNAL HEMORRHOIDS: ICD-10-CM

## 2023-06-01 PROBLEM — O47.9 THREATENED LABOR AT TERM: Status: RESOLVED | Noted: 2023-04-14 | Resolved: 2023-06-01

## 2023-06-01 PROBLEM — Z36.89 ENCOUNTER FOR TRIAGE IN PREGNANT PATIENT: Status: RESOLVED | Noted: 2023-04-14 | Resolved: 2023-06-01

## 2023-06-01 PROBLEM — O09.522 MULTIGRAVIDA OF ADVANCED MATERNAL AGE IN SECOND TRIMESTER: Status: RESOLVED | Noted: 2022-10-05 | Resolved: 2023-06-01

## 2023-06-01 PROCEDURE — 99207 PR POST PARTUM EXAM: CPT | Performed by: OBSTETRICS & GYNECOLOGY

## 2023-06-01 ASSESSMENT — ANXIETY QUESTIONNAIRES
2. NOT BEING ABLE TO STOP OR CONTROL WORRYING: NOT AT ALL
GAD7 TOTAL SCORE: 0
6. BECOMING EASILY ANNOYED OR IRRITABLE: NOT AT ALL
5. BEING SO RESTLESS THAT IT IS HARD TO SIT STILL: NOT AT ALL
7. FEELING AFRAID AS IF SOMETHING AWFUL MIGHT HAPPEN: NOT AT ALL
IF YOU CHECKED OFF ANY PROBLEMS ON THIS QUESTIONNAIRE, HOW DIFFICULT HAVE THESE PROBLEMS MADE IT FOR YOU TO DO YOUR WORK, TAKE CARE OF THINGS AT HOME, OR GET ALONG WITH OTHER PEOPLE: NOT DIFFICULT AT ALL
1. FEELING NERVOUS, ANXIOUS, OR ON EDGE: NOT AT ALL
GAD7 TOTAL SCORE: 0
3. WORRYING TOO MUCH ABOUT DIFFERENT THINGS: NOT AT ALL

## 2023-06-01 ASSESSMENT — PATIENT HEALTH QUESTIONNAIRE - PHQ9
SUM OF ALL RESPONSES TO PHQ QUESTIONS 1-9: 2
5. POOR APPETITE OR OVEREATING: NOT AT ALL

## 2023-06-01 ASSESSMENT — PAIN SCALES - GENERAL: PAINLEVEL: NO PAIN (0)

## 2023-06-01 NOTE — PROGRESS NOTES
POSTPARTUM VISIT    REASON FOR VISIT / CHEIF COMPLAINT  Postpartum exam.    HISTORY OF PRESENT ILLNESS  Eli Al is a 37 year old  female who is 6 weeks status post normal spontaneous vaginal delivery on 2023 delivering a healthy baby girl. She presents for postpartum visit today. She is doing well.  She complains of worsening hemorrhoids since delivery.  They are very tender and uncomfortable.  She has some bleeding from the external hemorrhoids despite over-the-counter Preparation H.  The patient's  has a vasectomy scheduled in [place] in August.  She just received Depo-Provera injection on 2023 at the [facility].  She plans to use Depo-Provera injection until after the vasectomy is completed. She is nursing exclusively. Her infant is doing well.    Delivery complications: No.  Breast feeding: Yes.  Bladder problems: No.  Bowel problems / hemorrhoids: Yes, painful, tender, bleeding external hemorrhoids.  Episiotomy / laceration / incision healed: Yes.  Vaginal lochia: None.  Menses since delivery: No.  Leoma since delivery: No.  Emotional adjustment: doing well and happy.  Depression symptoms or suicide thoughts: No.  Post delivery pain: No.  Contraception plans: vasectomy.  Other concerns: No.    ALLERGIES     Allergies   Allergen Reactions     Morphine      Became confused and non responsive       MEDICATIONS    Current Outpatient Medications:      docusate sodium (COLACE) 100 MG capsule, Take 1 capsule (100 mg) by mouth 2 times daily as needed for constipation, Disp: 90 capsule, Rfl: 1     Prenatal Vit-Fe Fumarate-FA (PRENATAL MULTIVITAMIN W/IRON) 27-0.8 MG tablet, Take 1 tablet by mouth daily, Disp: , Rfl:     REVIEW OF SYSTEMS  General:  No fever, chills, fatigue, unintentional weight loss, or weight gain  HEENT:  No sore throat, nasal congestion, changes in vision or changes in hearing  Cardiovascular:  No chest pain, irregular heartbeat or racing  heart  Respiratory:  No shortness of breath, cough, or wheezing  Gastrointestinal:  No nausea, vomiting, diarrhea, constipation or abdominal pain  Genitourinary:  No leaking urine, pain with urination, burning with urination, irregular vaginal bleeding, heavy periods, painful periods, abnormal vaginal discharge or leaking gas or stool  Skin:  No rashes or skin lesions  Breasts:  No masses or lumps, positive for discharge from the nipples  Neuro:  No difficulty with memory, numbness, tingling, or falls  Psych:  No anxiety, depression or difficulty sleeping  Endocrine:  No excessive thirst, hair loss, intolerance of heat or cold    Past Medical History:   Diagnosis Date     Cellulitis and abscess 2012    MRSA positive     External hemorrhoids 2023     MRSA (methicillin resistant staph aureus) culture positive 2012    Date & Source of First Known MRSA:  12 Neck   Date and source of negative screens that qualify* for resolution of MRSA from infection table:  NONE  *2 sets of negative screens (previous positive site(s) if applicable and bilateral anterior nares) at least 7 days apart are required to resolve.  Screening exclusions include dialysis, long term care residence, antibiotics within the past 7 days,       No past surgical history on file.    OB History    Para Term  AB Living   1 1 1 0 0 1   SAB IAB Ectopic Multiple Live Births   0 0 0 0 1      # Outcome Date GA Lbr [place]/2nd Weight Sex Delivery Anes PTL Lv   1 Term 23 40w0d / 02:27 3.005 kg (6 lb 10 oz) F Vag-Spont EPI N KALIN      Name: AALIYAH OSEGUERA-MICHELLE      Apgar1: 9  Apgar5: 9       Social History     Tobacco Use     Smoking status: Every Day     Types: Vaping Device     Smokeless tobacco: Never     Tobacco comments:     smokes and vapes 21   Vaping Use     Vaping status: Not on file   Substance Use Topics     Alcohol use: Not on file     History   Sexual Activity     Sexual activity: Not on file       No  "family history on file.    OBJECTIVE  /78   Ht 1.6 m (5' 3\")   Wt 80.3 kg (177 lb)   LMP 07/08/2022 (Exact Date)   Breastfeeding Yes   BMI 31.35 kg/m      General:  Well-developed, well-nourished female in no apparent distress.  Neurological: Alert and oriented x3.  Skin:  No rashes or lesions  Breast:  Breasts are bilaterally symmetrical. No masses, tenderness, retractions, skin changes, erythema, discharge or adenopathy bilaterally.  Lungs:  Clear to auscultation bilaterally with good inspiratory effort.  No wheezing rhonchi or rales noted. Breathing nonlabored.  Heart:  Regular rate and rhythm without murmur. No JVD.  No peripheral vascular disease.  Abdomen: Soft, nontender, nondistended, positive bowel sounds.  No organomegaly. No rebound, no guarding.  Pelvic exam:  Normal external female genitalia without lesions or abnormalities. Normal pubic hair distribution. Urethral meatus normal in appearance and without masses. Normal Bartholin, Urethral and [place]'s glands. Vaginal mucosa is pink and moist with a small amount of physiologic discharge present in the posterior vaginal fornix. Vaginal laceration is well healed.  Well estrogenized vaginal mucosa.   Normal multiparous cervix without lesions or abnormalities. No cervical motion tenderness to palpation. The bladder and urethra are nontender to palpation. Uterus is normal size, shape, consistency, anteflexed, mobile, and nontender. No adnexal masses or tenderness bilaterally.  Rectal exam: Two large external hemorrhoids noted. No rectovaginal nodularity noted. Examination confirms the vaginal exam.  Extremities:  No clubbing cyanosis or edema. Nontender bilaterally.  Spine:  No costovertebral angle tenderness bilaterally.     Chaperone: Debbie Cheema LPN    DIAGNOSTICS  2022 Pap smear: Normal per patient    PHQ-9 score:       6/1/2023    11:49 AM   PHQ   PHQ-9 Total Score 2   Q9: Thoughts of better off dead/self-harm past 2 weeks Not at all "       ASSESSMENT / PLAN  Eli Al is a 37 year old  female who is 6 weeks status post normal spontaneous vaginal delivery on 2023.    1 Normal postpartum exam after vaginal delivery  - The patient is making excellent postpartum progress.  - Routine postpartum precautions, recommendations and restrictions are reviewed with the patient.  - Routine postpartum depression precautions are reviewed with the patient.  - The patient may resume all normal activities without restrictions.  - I recommend that the patient refrain from intercourse for 6-8 weeks after delivery.  I recommend the patient be on reliable contraception before resuming intercourse.  - I discussed Kegel exercises.  - Pap smear declined by patient.  [facility]    2 Contraception counseling  - I discussed contraception options available to the patient including oral contraceptive pills both continuous and cyclic, progestin only oral contraceptive pills, Ortho Evra patch, NuvaRing, Depo-Provera injections, Nexplanon, Mirena IUD, ParaGard IUD, Saira IUD and condoms.  I reviewed the risks, benefits, alternatives, indications and side effects of each of these contraception options with the patient.  - The patient will use Depo-Provera for birth control until after her 's vasectomy is completed in August.  She [facility]  - All questions were answered.  - Contraceptive compliance was emphasized.    3 Nursing  - Nursing is encouraged.  - Follow up with outpatient lactation appointment as needed.    4 A1GDM  - I recommend that the patient continue low carb diet and have a 2 hour postpartum glucose test at the 6 week postpartum visit. Ordered.  [facility]    5 External hemorrhoids  - The patient has 2 large symptomatic external hemorrhoids.  - I discussed use of Preparation H.  - I recommend consult to  General surgery for further evaluation and management.  Ordered.  General surgery will contact patient directly to schedule.    - I recommend that the patient follow up with her primary provider for adult preventive services and annual examination as needed.  - All of the patient's questions were answered.  - I will be happy to see the patient on an as needed basis.    - Problem list reviewed and updated.  - Follow up annually or sooner as needed.    Gamaliel Baca MD  Obstetrics and Gynecology

## 2023-06-05 ENCOUNTER — MEDICAL CORRESPONDENCE (OUTPATIENT)
Dept: HEALTH INFORMATION MANAGEMENT | Facility: CLINIC | Age: 38
End: 2023-06-05

## 2023-06-21 ENCOUNTER — TRANSCRIBE ORDERS (OUTPATIENT)
Dept: OTHER | Age: 38
End: 2023-06-21

## 2023-06-21 DIAGNOSIS — K64.9 UNSPECIFIED HEMORRHOIDS: Primary | ICD-10-CM

## 2023-06-22 ENCOUNTER — MEDICAL CORRESPONDENCE (OUTPATIENT)
Dept: HEALTH INFORMATION MANAGEMENT | Facility: HOSPITAL | Age: 38
End: 2023-06-22

## 2023-08-23 ENCOUNTER — MEDICAL CORRESPONDENCE (OUTPATIENT)
Dept: HEALTH INFORMATION MANAGEMENT | Facility: HOSPITAL | Age: 38
End: 2023-08-23

## 2023-09-28 ENCOUNTER — HOSPITAL ENCOUNTER (EMERGENCY)
Facility: HOSPITAL | Age: 38
Discharge: HOME | End: 2023-09-28
Payer: COMMERCIAL

## 2023-09-28 VITALS
TEMPERATURE: 101.9 F | SYSTOLIC BLOOD PRESSURE: 128 MMHG | DIASTOLIC BLOOD PRESSURE: 82 MMHG | OXYGEN SATURATION: 96 % | HEART RATE: 119 BPM | RESPIRATION RATE: 16 BRPM

## 2023-09-28 DIAGNOSIS — J03.90 TONSILLITIS: ICD-10-CM

## 2023-09-28 LAB — GROUP A STREP BY PCR: NOT DETECTED

## 2023-09-28 PROCEDURE — 87651 STREP A DNA AMP PROBE: CPT

## 2023-09-28 PROCEDURE — 99213 OFFICE O/P EST LOW 20 MIN: CPT

## 2023-09-28 PROCEDURE — G0463 HOSPITAL OUTPT CLINIC VISIT: HCPCS

## 2023-09-28 PROCEDURE — 250N000013 HC RX MED GY IP 250 OP 250 PS 637

## 2023-09-28 RX ORDER — IBUPROFEN 600 MG/1
600 TABLET, FILM COATED ORAL ONCE
Status: COMPLETED | OUTPATIENT
Start: 2023-09-28 | End: 2023-09-28

## 2023-09-28 RX ORDER — CEPHALEXIN 500 MG/1
500 CAPSULE ORAL 2 TIMES DAILY
Qty: 20 CAPSULE | Refills: 0 | Status: SHIPPED | OUTPATIENT
Start: 2023-09-28 | End: 2023-10-08

## 2023-09-28 RX ORDER — CEPHALEXIN 500 MG/1
500 CAPSULE ORAL 2 TIMES DAILY
Qty: 20 CAPSULE | Refills: 0 | Status: SHIPPED | OUTPATIENT
Start: 2023-09-28 | End: 2023-09-28

## 2023-09-28 RX ORDER — ACETAMINOPHEN 325 MG/1
975 TABLET ORAL ONCE
Status: COMPLETED | OUTPATIENT
Start: 2023-09-28 | End: 2023-09-28

## 2023-09-28 RX ADMIN — IBUPROFEN 600 MG: 600 TABLET ORAL at 10:23

## 2023-09-28 RX ADMIN — ACETAMINOPHEN 975 MG: 325 TABLET, FILM COATED ORAL at 10:22

## 2023-09-28 ASSESSMENT — ENCOUNTER SYMPTOMS
APPETITE CHANGE: 1
COUGH: 1
ACTIVITY CHANGE: 0
ABDOMINAL PAIN: 0
DIARRHEA: 0
VOMITING: 0
NAUSEA: 0
FATIGUE: 1
SORE THROAT: 1
SHORTNESS OF BREATH: 0
TROUBLE SWALLOWING: 1
CHILLS: 1
FEVER: 1

## 2023-09-28 NOTE — ED TRIAGE NOTES
Pt presents with c/o having sore throat and fevers   S/x started yesterday   No otc meds taken today ibu last night

## 2023-09-28 NOTE — ED PROVIDER NOTES
History     Chief Complaint   Patient presents with    Pharyngitis     HPI  Eli Al is a 38 year old female who presents to the urgent care with a 2 day history of fever, cough, and sore throat. She denies chest pain, shortness of breath, and n/v/d. Has had a decreased oral intake due to pain with swallowing. Currently breast feeding. No OTC medications or recent abx. She did do a salt water gargle this AM without change in symptoms.     Allergies:  Allergies   Allergen Reactions    Morphine      Became confused and non responsive       Problem List:    Patient Active Problem List    Diagnosis Date Noted    External hemorrhoids 06/01/2023     Priority: Medium    Encounter for sterilization 03/28/2023     Priority: Medium     Tubal/salpingectomy if CS, consent done          Past Medical History:    Past Medical History:   Diagnosis Date    Cellulitis and abscess 07/12/2012    External hemorrhoids 6/1/2023    MRSA (methicillin resistant staph aureus) culture positive 07/12/2012       Past Surgical History:    No past surgical history on file.    Family History:    No family history on file.    Social History:  Marital Status:  Single [1]  Social History     Tobacco Use    Smoking status: Every Day     Types: Vaping Device    Smokeless tobacco: Never    Tobacco comments:     smokes and vapes 2/2/21        Medications:    cephALEXin (KEFLEX) 500 MG capsule  docusate sodium (COLACE) 100 MG capsule  Prenatal Vit-Fe Fumarate-FA (PRENATAL MULTIVITAMIN W/IRON) 27-0.8 MG tablet          Review of Systems   Constitutional:  Positive for appetite change, chills, fatigue and fever. Negative for activity change.   HENT:  Positive for sore throat and trouble swallowing.    Respiratory:  Positive for cough. Negative for shortness of breath.    Cardiovascular:  Negative for chest pain.   Gastrointestinal:  Negative for abdominal pain, diarrhea, nausea and vomiting.   Skin:  Negative for rash.   All other systems  reviewed and are negative.      Physical Exam   BP: 128/82  Pulse: 119  Temp: (!) 101.9  F (38.8  C)  Resp: 16  SpO2: 96 %      Physical Exam  Vitals and nursing note reviewed.   Constitutional:       General: She is not in acute distress.     Appearance: She is well-developed. She is ill-appearing. She is not toxic-appearing or diaphoretic.   HENT:      Head:      Jaw: No trismus or pain on movement.      Right Ear: Tympanic membrane and ear canal normal. No drainage, swelling or tenderness. No middle ear effusion. Tympanic membrane is not erythematous.      Left Ear: Tympanic membrane and ear canal normal. No drainage, swelling or tenderness.  No middle ear effusion. Tympanic membrane is not erythematous.      Mouth/Throat:      Mouth: Mucous membranes are moist.      Pharynx: Oropharyngeal exudate and posterior oropharyngeal erythema present. No pharyngeal swelling or uvula swelling.      Tonsils: Tonsillar exudate present. No tonsillar abscesses. 2+ on the right. 2+ on the left.   Cardiovascular:      Rate and Rhythm: Regular rhythm. Tachycardia present.      Heart sounds: Normal heart sounds. No murmur heard.  Pulmonary:      Effort: Pulmonary effort is normal. No respiratory distress.      Breath sounds: Normal breath sounds. No stridor. No wheezing, rhonchi or rales.   Lymphadenopathy:      Head:      Right side of head: Tonsillar adenopathy present.      Left side of head: Tonsillar adenopathy present.      Cervical: Cervical adenopathy present.      Right cervical: Superficial cervical adenopathy present.      Left cervical: Superficial cervical adenopathy present.   Skin:     General: Skin is warm and dry.      Capillary Refill: Capillary refill takes less than 2 seconds.   Neurological:      General: No focal deficit present.      Mental Status: She is alert and oriented to person, place, and time.   Psychiatric:         Mood and Affect: Mood normal.         Behavior: Behavior normal.         ED Course                  Procedures         Results for orders placed or performed during the hospital encounter of 09/28/23 (from the past 24 hour(s))   Group A Streptococcus PCR Throat Swab    Specimen: Throat; Swab   Result Value Ref Range    Group A strep by PCR Not Detected Not Detected    Narrative    The Xpert Xpress Strep A test, performed on the [employer], is a rapid, qualitative in vitro diagnostic test for the detection of Streptococcus pyogenes (Group A ß-hemolytic Streptococcus, Strep A) in throat swab specimens from patients with signs and symptoms of pharyngitis. The Xpert Xpress Strep A test can be used as an aid in the diagnosis of Group A Streptococcal pharyngitis. The assay is not intended to monitor treatment for Group A Streptococcus infections. The Xpert Xpress Strep A test utilizes an automated real-time polymerase chain reaction (PCR) to detect Streptococcus pyogenes DNA.       Medications   acetaminophen (TYLENOL) tablet 975 mg (975 mg Oral $Given 9/28/23 1022)   ibuprofen (ADVIL/MOTRIN) tablet 600 mg (600 mg Oral $Given 9/28/23 1023)       Assessments & Plan (with Medical Decision Making)     I have reviewed the nursing notes.    I have reviewed the findings, diagnosis, plan and need for follow up with the patient.  Eli Al is a 38 year old female who presents to the urgent care with a 2 day history of fever, cough, and sore throat. She denies chest pain, shortness of breath, and n/v/d. Has had a decreased oral intake due to pain with swallowing. Currently breast feeding. No OTC medications or recent abx. She did do a salt water gargle this AM without change in symptoms.     MDM: Strep negative.   Febrile with tachycardia on arrival ibuprofen and tylenol given while in the . Declines vital sign recheck or to stay for further evaluation as she needs to get home to her infant. Lungs clear, heart tones regular. TM clear bilaterally. There is erythema to posterior  oropharynx with exudate and erythema to tonsils. Tonsils 2+ and equal. Uvula midline. No visible peritonsillar abscess. No trismus. With the tonsillar exudate and erythema, will treat as tonsillitis. Return precautions discussed. She is in agreement with plan.     (J03.90) Tonsillitis  Plan: Antibiotics twice daily for 10 days. Yogurt or probiotic while taking. Push fluids. Alternate tylenol and ibuprofen for pain and fever control. We gave you both while in the  today. These medications are all safe for breast feeding.     Continue with warm salt water gargles. You can also try teas with honey and cool liquid to sooth throat. Return with any inability to swallow, difficulty opening mouth, vomiting, uncontrolled fevers, or other concern. Understanding verbalized.          New Prescriptions    CEPHALEXIN (KEFLEX) 500 MG CAPSULE    Take 1 capsule (500 mg) by mouth 2 times daily for 10 days       Final diagnoses:   Tonsillitis       9/28/2023   [facility]       Elana Barajas NP  09/28/23 [id number]

## 2023-09-28 NOTE — DISCHARGE INSTRUCTIONS
Antibiotics twice daily for 10 days. Yogurt or probiotic while taking. Push fluids. Alternate tylenol and ibuprofen for pain and fever control. We gave you both while in the UC today. These medications are all safe for breast feeding.     Continue with warm salt water gargles. You can also try teas with honey and cool liquid to sooth throat. Return with any inability to swallow, difficulty opening mouth, vomiting, uncontrolled fevers, or other concern.

## 2023-10-20 ENCOUNTER — MEDICAL CORRESPONDENCE (OUTPATIENT)
Dept: HEALTH INFORMATION MANAGEMENT | Facility: HOSPITAL | Age: 38
End: 2023-10-20

## 2025-05-11 ENCOUNTER — HEALTH MAINTENANCE LETTER (OUTPATIENT)
Age: 40
End: 2025-05-11